# Patient Record
Sex: MALE | Race: WHITE | NOT HISPANIC OR LATINO | Employment: FULL TIME | URBAN - METROPOLITAN AREA
[De-identification: names, ages, dates, MRNs, and addresses within clinical notes are randomized per-mention and may not be internally consistent; named-entity substitution may affect disease eponyms.]

---

## 2017-01-06 ENCOUNTER — HOSPITAL ENCOUNTER (OUTPATIENT)
Dept: INFUSION THERAPY | Facility: HOSPITAL | Age: 62
Discharge: HOME | End: 2017-01-06
Admitting: INTERNAL MEDICINE

## 2017-01-06 VITALS
HEART RATE: 83 BPM | OXYGEN SATURATION: 98 % | TEMPERATURE: 96.3 F | DIASTOLIC BLOOD PRESSURE: 66 MMHG | SYSTOLIC BLOOD PRESSURE: 110 MMHG | RESPIRATION RATE: 20 BRPM

## 2017-01-06 DIAGNOSIS — E83.119 HEMOCHROMATOSIS, UNSPECIFIED HEMOCHROMATOSIS TYPE: Primary | ICD-10-CM

## 2017-01-06 LAB
HCT VFR BLD AUTO: 40.4 % (ref 40.4–52.2)
HGB BLD-MCNC: 13.1 G/DL (ref 13.7–17.6)

## 2017-01-06 PROCEDURE — 85014 HEMATOCRIT: CPT | Performed by: INTERNAL MEDICINE

## 2017-01-06 PROCEDURE — 99195 PHLEBOTOMY: CPT

## 2017-01-06 PROCEDURE — 36415 COLL VENOUS BLD VENIPUNCTURE: CPT

## 2017-01-06 PROCEDURE — 85018 HEMOGLOBIN: CPT | Performed by: INTERNAL MEDICINE

## 2017-01-06 NOTE — IP AVS SNAPSHOT
Rafael Bernstein   2017 10:00 AM   PHLEBOTOMY    Provider:  TRESSA RM 1 [facility]U   Department:  [facility] CARE   Dept Phone:  [phone]                Your Full Care Plan           To Do List     2017 8:00 AM     Appointment with TERSSA RM [facility] ACU at [facility] ([phone])   [address] [place] [facility] [address]            Your Updated Medication List      ASK your doctor about these medications     aspirin 81 MG tablet       HYDROcodone-acetaminophen 5-325 MG per tablet   Commonly known as:  NORCO       meloxicam 15 MG tablet   Commonly known as:  MOBIC               Gung[employer] Signup     [facility] [employer] allows you to send messages to your doctor, view your test results, renew your prescriptions, schedule appointments, and more. To sign up, go to [email or web address] and click on the Sign Up Now link in the New User? box. Enter your [employer] Activation Code exactly as it appears below along with the last four digits of your Social Security Number and your Date of Birth () to complete the sign-up process. If you do not sign up before the expiration date, you must request a new code.    [employer] Activation Code: [email or web address]  Expires: 2017 11:29 AM    If you have questions, you can email [email or web address] or call [phone] to talk to our [employer] staff. Remember, [employer] is NOT to be used for urgent needs. For medical emergencies, dial 911.               Other Info from Your Visit           Allergies     Penicillins       Reason for Visit     Procedure           Vital Signs     Blood Pressure Pulse Temperature Respirations Oxygen Saturation Smoking Status    141/73 98 96.3 °F (35.7 °C) (Oral) 20 98% Current Every Day Smoker      Discharge Instructions     None         SYMPTOMS OF A STROKE    Call 911 or have someone take you to the Emergency Department if you have any of the following:    · Sudden  numbness or weakness of your face, arm or leg especially on one side of the body  · Sudden confusion, diffiiculty speaking or trouble understanding   · Changes in your vision or loss of sight in one eye  · Sudden severe headache with no known cause  · sudden dizziness, trouble walking, loss of balance or coordination    It is important to seek emergency care right away if you have further stroke symptoms. If you get emergency help quickly, the powerful clot-dissolving medicines can reduce the disabilities caused by a stroke.     For more information:    American Stroke Association  9-035-2-STROKE  www.strokeassociation.org           IF YOU SMOKE OR USE TOBACCO PLEASE READ THE FOLLOWING:    Why is smoking bad for me?  Smoking increases the risk of heart disease, lung disease, vascular disease, stroke, and cancer.     If you smoke, STOP!    If you would like more information on quitting smoking, please visit the [facility] website: [email or web address]   This link will provide additional resources including the QUIT line and the Beat the Pack support groups.     For more information:    American Cancer Society  ([phone] 729-9647    American Heart Association  1-417.506.6815

## 2017-01-06 NOTE — PROGRESS NOTES
Prior Phlebotomy: Yes ?X  No ?      If so any side effects?  NONE    Lot # of Phlebotomy bag: [id number]    Start Time: 1115    Stop Time:1121    Amount removed: 569.8 GMS    Grams / 1.06 =536.6 cc’s     TOLERATED TREATMENT WELL. 2 APPLE JUICES BEFORE PROCEDURE, VSS AFTER. LA SITE WITH 2X2 GAUZE AND CO-FLEX PRESSURE DRESSING APPLIED.

## 2017-01-19 ENCOUNTER — HOSPITAL ENCOUNTER (OUTPATIENT)
Dept: INFUSION THERAPY | Facility: HOSPITAL | Age: 62
Discharge: HOME | End: 2017-01-19
Admitting: INTERNAL MEDICINE

## 2017-01-19 VITALS
HEART RATE: 93 BPM | TEMPERATURE: 97.6 F | OXYGEN SATURATION: 94 % | DIASTOLIC BLOOD PRESSURE: 76 MMHG | RESPIRATION RATE: 20 BRPM | SYSTOLIC BLOOD PRESSURE: 138 MMHG

## 2017-01-19 DIAGNOSIS — E83.118 OTHER HEMOCHROMATOSIS: Primary | ICD-10-CM

## 2017-01-19 LAB
HCT VFR BLD AUTO: 36.1 % (ref 40.4–52.2)
HGB BLD-MCNC: 11.2 G/DL (ref 13.7–17.6)

## 2017-01-19 PROCEDURE — 36415 COLL VENOUS BLD VENIPUNCTURE: CPT

## 2017-01-19 PROCEDURE — 85018 HEMOGLOBIN: CPT | Performed by: INTERNAL MEDICINE

## 2017-01-19 PROCEDURE — 85014 HEMATOCRIT: CPT | Performed by: INTERNAL MEDICINE

## 2017-01-19 PROCEDURE — G0463 HOSPITAL OUTPT CLINIC VISIT: HCPCS

## 2017-01-19 NOTE — IP AVS SNAPSHOT
Rafael Bernstein   2017  8:00 AM   PHLEBOTOMY    Provider:  TRESSA RM [facility]   Department:  [facility] CARE   Dept Phone:  [phone]                Your Full Care Plan           To Do List     2017 12:00 PM     Appointment with TRESSA RM 1 [facility] at [facility] ([phone])   [address] [place] [facility] [address]            Your Updated Medication List      ASK your doctor about these medications     aspirin 81 MG tablet       HYDROcodone-acetaminophen 5-325 MG per tablet   Commonly known as:  NORCO       meloxicam 15 MG tablet   Commonly known as:  MOBIC               [email or web address] Signup     [facility] [employer] allows you to send messages to your doctor, view your test results, renew your prescriptions, schedule appointments, and more. To sign up, go to [employer] and click on the Sign Up Now link in the New User? box. Enter your [employer] Activation Code exactly as it appears below along with the last four digits of your Social Security Number and your Date of Birth () to complete the sign-up process. If you do not sign up before the expiration date, you must request a new code.    [employer] Activation Code: [email or web address]  Expires: 2017 11:29 AM    If you have questions, you can email [email or web address] or call [phone] to talk to our [employer] staff. Remember, [employer] is NOT to be used for urgent needs. For medical emergencies, dial 911.               Other Info from Your Visit           Allergies     Penicillins       Reason for Visit     Procedure     Therapeutic Phlebotomy           Vital Signs     Blood Pressure Pulse Temperature Respirations Oxygen Saturation Smoking Status    138/76 93 97.6 °F (36.4 °C) (Oral) 20 94% Current Every Day Smoker      Discharge Instructions     None         SYMPTOMS OF A STROKE    Call 911 or have someone take you to the Emergency Department if you have any of  the following:    · Sudden numbness or weakness of your face, arm or leg especially on one side of the body  · Sudden confusion, diffiiculty speaking or trouble understanding   · Changes in your vision or loss of sight in one eye  · Sudden severe headache with no known cause  · sudden dizziness, trouble walking, loss of balance or coordination    It is important to seek emergency care right away if you have further stroke symptoms. If you get emergency help quickly, the powerful clot-dissolving medicines can reduce the disabilities caused by a stroke.     For more information:    American Stroke Association  7-330-7-STROKE  www.strokeassociation.org           IF YOU SMOKE OR USE TOBACCO PLEASE READ THE FOLLOWING:    Why is smoking bad for me?  Smoking increases the risk of heart disease, lung disease, vascular disease, stroke, and cancer.     If you smoke, STOP!    If you would like more information on quitting smoking, please visit the [facility] website: [email or web address]   This link will provide additional resources including the QUIT line and the Beat the Pack support groups.     For more information:    American Cancer Society  ([phone]    American Heart Association  1-985.781.6730

## 2017-02-02 ENCOUNTER — HOSPITAL ENCOUNTER (OUTPATIENT)
Dept: INFUSION THERAPY | Facility: HOSPITAL | Age: 62
Discharge: HOME | End: 2017-02-02
Admitting: INTERNAL MEDICINE

## 2017-02-02 VITALS
OXYGEN SATURATION: 97 % | SYSTOLIC BLOOD PRESSURE: 125 MMHG | DIASTOLIC BLOOD PRESSURE: 82 MMHG | TEMPERATURE: 97.9 F | RESPIRATION RATE: 20 BRPM | HEART RATE: 79 BPM

## 2017-02-02 DIAGNOSIS — E83.119 HEMOCHROMATOSIS, UNSPECIFIED HEMOCHROMATOSIS TYPE: Primary | ICD-10-CM

## 2017-02-02 LAB
FERRITIN SERPL-MCNC: 79.4 NG/ML (ref 30–400)
HCT VFR BLD AUTO: 37.4 % (ref 40.4–52.2)
HGB BLD-MCNC: 12.2 G/DL (ref 13.7–17.6)

## 2017-02-02 PROCEDURE — 36415 COLL VENOUS BLD VENIPUNCTURE: CPT

## 2017-02-02 PROCEDURE — 85014 HEMATOCRIT: CPT | Performed by: INTERNAL MEDICINE

## 2017-02-02 PROCEDURE — 99195 PHLEBOTOMY: CPT

## 2017-02-02 PROCEDURE — 82728 ASSAY OF FERRITIN: CPT | Performed by: INTERNAL MEDICINE

## 2017-02-02 PROCEDURE — 85018 HEMOGLOBIN: CPT | Performed by: INTERNAL MEDICINE

## 2017-02-02 NOTE — PROGRESS NOTES
Prior Phlebotomy: Yes ?X  No ?      If so any side effects? NONE      Lot # of Phlebotomy bag: [id number]    Start Time: 1320    Stop Time:1328    Amount removed: 558.6 GM    Grams / 1.06 =526.4 cc’s   TOLERATED TREATMENT WITHOUT DIFFICULTY. LA DRESSING AFTER PHLEBOTOMY WITH CO-FLEX.   VSS POST PROCEDURE. PO 2 CUPS OF APPLEJUICE. DC'D HOME AMB AFTER TREATMENT COMPLETED.

## 2017-02-16 ENCOUNTER — HOSPITAL ENCOUNTER (OUTPATIENT)
Dept: INFUSION THERAPY | Facility: HOSPITAL | Age: 62
Discharge: HOME | End: 2017-02-16
Admitting: INTERNAL MEDICINE

## 2017-02-16 VITALS
HEART RATE: 85 BPM | TEMPERATURE: 96.8 F | RESPIRATION RATE: 16 BRPM | SYSTOLIC BLOOD PRESSURE: 119 MMHG | DIASTOLIC BLOOD PRESSURE: 63 MMHG | OXYGEN SATURATION: 94 %

## 2017-02-16 DIAGNOSIS — E83.118 OTHER HEMOCHROMATOSIS: Primary | ICD-10-CM

## 2017-02-16 DIAGNOSIS — E83.119 HEMOCHROMATOSIS, UNSPECIFIED HEMOCHROMATOSIS TYPE: ICD-10-CM

## 2017-02-16 LAB
HCT VFR BLD AUTO: 36.2 % (ref 40.4–52.2)
HGB BLD-MCNC: 11.8 G/DL (ref 13.7–17.6)

## 2017-02-16 PROCEDURE — 85018 HEMOGLOBIN: CPT | Performed by: INTERNAL MEDICINE

## 2017-02-16 PROCEDURE — 99195 PHLEBOTOMY: CPT

## 2017-02-16 PROCEDURE — 85014 HEMATOCRIT: CPT | Performed by: INTERNAL MEDICINE

## 2017-02-16 PROCEDURE — 36415 COLL VENOUS BLD VENIPUNCTURE: CPT

## 2017-02-16 NOTE — PROGRESS NOTES
Prior Phlebotomy: Yes ?X  No ?      If so any side effects?  NONE    Lot # of Phlebotomy bag: [id number]    Start Time: 1310    Stop Time:1322    Amount removed: 552 GRAMS    Grams / 1.06 =511.3 cc’s   TOLERATED TREATMENT WELL. VSS AFTER PHLEBOTOMY, PO JUICE 2 CARTONS, RA WITH 2X2 GAUZE AND CO-FLEX PRESSURE DRESSING TO SITE. DC'D HOME AMB AFTER FLUIDS AND VITAL SIGNS TAKEN.

## 2017-02-21 ENCOUNTER — TELEPHONE (OUTPATIENT)
Dept: FAMILY MEDICINE CLINIC | Facility: CLINIC | Age: 62
End: 2017-02-21

## 2017-02-21 NOTE — TELEPHONE ENCOUNTER
----- Message from James Brice MD sent at 2/21/2017  1:28 PM EST -----  Contact: TELE: [phone]   I am unable to do a physical by next Tuesday.  ----- Message -----     From: Sharita Granda MA     Sent: 2/21/2017   1:07 PM       To: James Brice MD        ----- Message -----     From: Roselia Fonseca     Sent: 2/21/2017   1:00 PM       To: Sharita Granda MA    HE IS GETTING READY TO RETIRE NEXT Tuesday AND HE WANTS TO GET A PHYSICAL IN FOR THE YEAR BEFORE HE LOSES HIS INSURANCE AND HE WANTS TO KNOW IF HE CAN GET IT WORKED IN IF POSS.     Apt scheduled for fatigue to check labs on Monday

## 2017-02-27 ENCOUNTER — OFFICE VISIT (OUTPATIENT)
Dept: FAMILY MEDICINE CLINIC | Facility: CLINIC | Age: 62
End: 2017-02-27

## 2017-02-27 VITALS
HEART RATE: 82 BPM | OXYGEN SATURATION: 96 % | WEIGHT: 222 LBS | SYSTOLIC BLOOD PRESSURE: 112 MMHG | BODY MASS INDEX: 31.78 KG/M2 | HEIGHT: 70 IN | DIASTOLIC BLOOD PRESSURE: 60 MMHG | TEMPERATURE: 98.3 F

## 2017-02-27 DIAGNOSIS — R53.83 FATIGUE, UNSPECIFIED TYPE: Primary | ICD-10-CM

## 2017-02-27 LAB
ALBUMIN SERPL-MCNC: 4 G/DL (ref 3.5–5.2)
ALBUMIN/GLOB SERPL: 1.1 G/DL
ALP SERPL-CCNC: 81 U/L (ref 39–117)
ALT SERPL W P-5'-P-CCNC: 14 U/L (ref 1–41)
ANION GAP SERPL CALCULATED.3IONS-SCNC: 14.3 MMOL/L
AST SERPL-CCNC: 16 U/L (ref 1–40)
BILIRUB SERPL-MCNC: 0.2 MG/DL (ref 0.1–1.2)
BUN BLD-MCNC: 16 MG/DL (ref 8–23)
BUN/CREAT SERPL: 19 (ref 7–25)
CALCIUM SPEC-SCNC: 9.5 MG/DL (ref 8.6–10.5)
CHLORIDE SERPL-SCNC: 104 MMOL/L (ref 98–107)
CO2 SERPL-SCNC: 22.7 MMOL/L (ref 22–29)
CREAT BLD-MCNC: 0.84 MG/DL (ref 0.76–1.27)
ERYTHROCYTE [DISTWIDTH] IN BLOOD BY AUTOMATED COUNT: 14.5 % (ref 4.5–15)
GFR SERPL CREATININE-BSD FRML MDRD: 93 ML/MIN/1.73
GLOBULIN UR ELPH-MCNC: 3.6 GM/DL
GLUCOSE BLD-MCNC: 99 MG/DL (ref 65–99)
HCT VFR BLD AUTO: 35.3 % (ref 35–60)
HGB BLD-MCNC: 11.5 G/DL (ref 13.5–18)
LYMPHOCYTES # BLD AUTO: 2.8 10*3/MM3 (ref 1.2–3.4)
LYMPHOCYTES NFR BLD AUTO: 32.9 % (ref 21–51)
MCH RBC QN AUTO: 29.1 PG (ref 26.1–33.1)
MCHC RBC AUTO-ENTMCNC: 32.6 G/DL (ref 33–37)
MCV RBC AUTO: 89.5 FL (ref 80–99)
MONOCYTES # BLD AUTO: 0.8 10*3/MM3 (ref 0.1–0.6)
MONOCYTES NFR BLD AUTO: 9.8 % (ref 2–9)
NEUTROPHILS # BLD AUTO: 4.9 10*3/MM3 (ref 1.4–6.5)
NEUTROPHILS NFR BLD AUTO: 57.3 % (ref 42–75)
PLATELET # BLD AUTO: 263 10*3/MM3 (ref 150–450)
PMV BLD AUTO: 8.1 FL (ref 7.1–10.5)
POTASSIUM BLD-SCNC: 4.1 MMOL/L (ref 3.5–5.2)
PROT SERPL-MCNC: 7.6 G/DL (ref 6–8.5)
RBC # BLD AUTO: 3.94 10*6/MM3 (ref 4–6)
SODIUM BLD-SCNC: 141 MMOL/L (ref 136–145)
T-UPTAKE NFR SERPL: 1.09 TBI (ref 0.8–1.3)
T4 SERPL-MCNC: 7.57 MCG/DL (ref 4.5–11.7)
TSH SERPL DL<=0.05 MIU/L-ACNC: 3.61 MIU/ML (ref 0.27–4.2)
WBC NRBC COR # BLD: 8.5 10*3/MM3 (ref 4.5–10)

## 2017-02-27 PROCEDURE — 84436 ASSAY OF TOTAL THYROXINE: CPT | Performed by: FAMILY MEDICINE

## 2017-02-27 PROCEDURE — 84443 ASSAY THYROID STIM HORMONE: CPT | Performed by: FAMILY MEDICINE

## 2017-02-27 PROCEDURE — 84479 ASSAY OF THYROID (T3 OR T4): CPT | Performed by: FAMILY MEDICINE

## 2017-02-27 PROCEDURE — 36415 COLL VENOUS BLD VENIPUNCTURE: CPT | Performed by: FAMILY MEDICINE

## 2017-02-27 PROCEDURE — 80053 COMPREHEN METABOLIC PANEL: CPT | Performed by: FAMILY MEDICINE

## 2017-02-27 PROCEDURE — 99213 OFFICE O/P EST LOW 20 MIN: CPT | Performed by: FAMILY MEDICINE

## 2017-02-27 PROCEDURE — 85025 COMPLETE CBC W/AUTO DIFF WBC: CPT | Performed by: FAMILY MEDICINE

## 2017-02-27 NOTE — PROGRESS NOTES
SUBJECTIVE:  The patient is a 62-year-old white male who comes in primarily today because of fatigue.  Over the last month and feels low on energy.  He is up to date on colonoscopy.  He denies any melena.  He does have hemachromatosis.  He has a right knee that was replaced but now started to give him more pain.  He takes hydrocodone twice a day.  He scheduled see his orthopedist next month.  He has some degree of sleep apnea but is not being treated presently.  No chest pain.    PAST MEDICAL HISTORY:  Reviewed.    REVIEW OF SYSTEMS:  Please see above; 14 point ROS otherwise negative.      OBJECTIVE: Vitals signs are reviewed and are stable.    HEENT: PERRLA.    Neck:  Supple.   No masses  Lungs:  Clear.    Heart:  Regular rate and rhythm.    Abdomen:   Soft, nontender.   Bowel sounds present  Extremities:  No cyanosis, clubbing or edema.      ASSESSMENT:     Fatigue    PLAN:   CBC CMP TSH thyroid profile are ordered.  He will call on his results.  He will see his orthopedist as scheduled.  He will follow-up after above.    Much of this encounter note is an electronic transcription/translation of spoken language to printed text.  The electronic translation of spoken language may permit erroneous, or at times, nonsensical words or phrases to be inadvertently transcribed.  Although I have reviewed the note for such errors, some may still exist.

## 2017-03-02 ENCOUNTER — APPOINTMENT (OUTPATIENT)
Dept: INFUSION THERAPY | Facility: HOSPITAL | Age: 62
End: 2017-03-02

## 2017-03-07 ENCOUNTER — TELEPHONE (OUTPATIENT)
Dept: FAMILY MEDICINE CLINIC | Facility: CLINIC | Age: 62
End: 2017-03-07

## 2017-03-08 LAB — GASTROCULT GAST QL: NEGATIVE

## 2017-03-08 PROCEDURE — 82270 OCCULT BLOOD FECES: CPT | Performed by: FAMILY MEDICINE

## 2017-06-01 ENCOUNTER — TELEPHONE (OUTPATIENT)
Dept: FAMILY MEDICINE CLINIC | Facility: CLINIC | Age: 62
End: 2017-06-01

## 2017-06-01 NOTE — TELEPHONE ENCOUNTER
PT'S WIFE SAID HE IS HAVING HEART ISSUES. SHE SAID FOR QUITE AWHILE NOW HE'S BEEN WEAK AND TIRED AND YESTERDAY HE RAISED HIS ARMS ABOVE HIS HEAD AND LOST HIS HEARING. SHE WANTS HIM SEEN TOMORROW BY SOMEONE. PLEASE CALL

## 2017-06-01 NOTE — TELEPHONE ENCOUNTER
Patients wife informed, they wanted to see PHILLIP suarez scheduled for Monday told to go to ER if symptoms worsen or reoccur before then

## 2017-06-01 NOTE — TELEPHONE ENCOUNTER
Needs to be seen by someone.  See if anyone has any openings.  If his symptoms are serious enough and she is concerned he should go to emergency room.

## 2017-07-26 ENCOUNTER — TRANSCRIBE ORDERS (OUTPATIENT)
Dept: ADMINISTRATIVE | Facility: HOSPITAL | Age: 62
End: 2017-07-26

## 2017-07-26 DIAGNOSIS — M48.061 LUMBAR STENOSIS: Primary | ICD-10-CM

## 2018-01-15 ENCOUNTER — HOSPITAL ENCOUNTER (OUTPATIENT)
Dept: GENERAL RADIOLOGY | Facility: HOSPITAL | Age: 63
Discharge: HOME | End: 2018-01-15
Admitting: ORTHOPAEDIC SURGERY

## 2018-01-15 ENCOUNTER — APPOINTMENT (OUTPATIENT)
Dept: PREADMISSION TESTING | Facility: HOSPITAL | Age: 63
End: 2018-01-15

## 2018-01-15 VITALS
RESPIRATION RATE: 16 BRPM | BODY MASS INDEX: 48.47 KG/M2 | SYSTOLIC BLOOD PRESSURE: 107 MMHG | WEIGHT: 246.9 LBS | HEART RATE: 90 BPM | HEIGHT: 60 IN | DIASTOLIC BLOOD PRESSURE: 72 MMHG | TEMPERATURE: 98.3 F | OXYGEN SATURATION: 95 %

## 2018-01-15 LAB
ABO GROUP BLD: NORMAL
ALBUMIN SERPL-MCNC: 4.1 G/DL (ref 3.5–5.2)
ALBUMIN/GLOB SERPL: 0.9 G/DL
ALP SERPL-CCNC: 78 U/L (ref 39–117)
ALT SERPL W P-5'-P-CCNC: 15 U/L (ref 1–41)
ANION GAP SERPL CALCULATED.3IONS-SCNC: 13.6 MMOL/L
AST SERPL-CCNC: 16 U/L (ref 1–40)
BILIRUB SERPL-MCNC: 0.4 MG/DL (ref 0.1–1.2)
BILIRUB UR QL STRIP: NEGATIVE
BLD GP AB SCN SERPL QL: NEGATIVE
BUN BLD-MCNC: 16 MG/DL (ref 8–23)
BUN/CREAT SERPL: 16.3 (ref 7–25)
CALCIUM SPEC-SCNC: 9.6 MG/DL (ref 8.6–10.5)
CHLORIDE SERPL-SCNC: 103 MMOL/L (ref 98–107)
CLARITY UR: CLEAR
CO2 SERPL-SCNC: 24.4 MMOL/L (ref 22–29)
COLOR UR: YELLOW
CREAT BLD-MCNC: 0.98 MG/DL (ref 0.76–1.27)
CRP SERPL-MCNC: 3.11 MG/DL (ref 0–0.5)
DEPRECATED RDW RBC AUTO: 44.9 FL (ref 37–54)
ERYTHROCYTE [DISTWIDTH] IN BLOOD BY AUTOMATED COUNT: 12.9 % (ref 11.5–14.5)
ERYTHROCYTE [SEDIMENTATION RATE] IN BLOOD: 68 MM/HR (ref 0–20)
GFR SERPL CREATININE-BSD FRML MDRD: 78 ML/MIN/1.73
GLOBULIN UR ELPH-MCNC: 4.4 GM/DL
GLUCOSE BLD-MCNC: 127 MG/DL (ref 65–99)
GLUCOSE UR STRIP-MCNC: NEGATIVE MG/DL
HCT VFR BLD AUTO: 41 % (ref 40.4–52.2)
HGB BLD-MCNC: 13.5 G/DL (ref 13.7–17.6)
HGB UR QL STRIP.AUTO: NEGATIVE
INR PPP: 1.01 (ref 0.9–1.1)
KETONES UR QL STRIP: NEGATIVE
LEUKOCYTE ESTERASE UR QL STRIP.AUTO: NEGATIVE
MCH RBC QN AUTO: 31.8 PG (ref 27–32.7)
MCHC RBC AUTO-ENTMCNC: 32.9 G/DL (ref 32.6–36.4)
MCV RBC AUTO: 96.7 FL (ref 79.8–96.2)
NITRITE UR QL STRIP: NEGATIVE
PH UR STRIP.AUTO: 6 [PH] (ref 5–8)
PLATELET # BLD AUTO: 212 10*3/MM3 (ref 140–500)
PMV BLD AUTO: 11.1 FL (ref 6–12)
POTASSIUM BLD-SCNC: 4 MMOL/L (ref 3.5–5.2)
PROT SERPL-MCNC: 8.5 G/DL (ref 6–8.5)
PROT UR QL STRIP: NEGATIVE
PROTHROMBIN TIME: 12.9 SECONDS (ref 11.7–14.2)
RBC # BLD AUTO: 4.24 10*6/MM3 (ref 4.6–6)
RH BLD: NEGATIVE
SODIUM BLD-SCNC: 141 MMOL/L (ref 136–145)
SP GR UR STRIP: 1.02 (ref 1–1.03)
UROBILINOGEN UR QL STRIP: NORMAL
WBC NRBC COR # BLD: 6.43 10*3/MM3 (ref 4.5–10.7)

## 2018-01-15 PROCEDURE — 86900 BLOOD TYPING SEROLOGIC ABO: CPT | Performed by: ORTHOPAEDIC SURGERY

## 2018-01-15 PROCEDURE — 85027 COMPLETE CBC AUTOMATED: CPT | Performed by: ORTHOPAEDIC SURGERY

## 2018-01-15 PROCEDURE — 71046 X-RAY EXAM CHEST 2 VIEWS: CPT

## 2018-01-15 PROCEDURE — 86901 BLOOD TYPING SEROLOGIC RH(D): CPT | Performed by: ORTHOPAEDIC SURGERY

## 2018-01-15 PROCEDURE — 86140 C-REACTIVE PROTEIN: CPT | Performed by: ORTHOPAEDIC SURGERY

## 2018-01-15 PROCEDURE — 36415 COLL VENOUS BLD VENIPUNCTURE: CPT

## 2018-01-15 PROCEDURE — 81003 URINALYSIS AUTO W/O SCOPE: CPT | Performed by: ORTHOPAEDIC SURGERY

## 2018-01-15 PROCEDURE — 86850 RBC ANTIBODY SCREEN: CPT | Performed by: ORTHOPAEDIC SURGERY

## 2018-01-15 PROCEDURE — 80053 COMPREHEN METABOLIC PANEL: CPT | Performed by: ORTHOPAEDIC SURGERY

## 2018-01-15 PROCEDURE — 85610 PROTHROMBIN TIME: CPT | Performed by: ORTHOPAEDIC SURGERY

## 2018-01-15 PROCEDURE — 85652 RBC SED RATE AUTOMATED: CPT | Performed by: ORTHOPAEDIC SURGERY

## 2018-01-15 RX ORDER — NAPROXEN SODIUM 220 MG
220 TABLET ORAL 2 TIMES DAILY PRN
COMMUNITY
End: 2018-01-21 | Stop reason: HOSPADM

## 2018-01-15 RX ORDER — MECLIZINE HYDROCHLORIDE 25 MG/1
25 TABLET ORAL 3 TIMES DAILY PRN
COMMUNITY

## 2018-01-15 RX ORDER — CHLORHEXIDINE GLUCONATE 500 MG/1
CLOTH TOPICAL
COMMUNITY
End: 2018-01-21 | Stop reason: HOSPADM

## 2018-01-15 ASSESSMENT — KOOS JR
KOOS JR SCORE: 36.931
KOOS JR SCORE: 20

## 2018-01-15 NOTE — DISCHARGE INSTRUCTIONS
Take the following medications the morning of surgery with a small sip of water:  CAN TAKE PAIN MEDICATIONS      General Instructions:  • Do not eat solid food after midnight the night before surgery.  • You may drink clear liquids day of surgery but must stop at least one hour before your hospital arrival time.  • It is beneficial for you to have a clear drink that contains carbohydrates the day of surgery.  We suggest a 12 to 20 ounce bottle of Gatorade or Powerade for non-diabetic patients or a 12 to 20 ounce bottle of G2 or Powerade Zero for diabetic patients. (Pediatric patients, are not advised to drink a 12 to 20 ounce carbohydrate drink)    Clear liquids are liquids you can see through.  Nothing red in color.     Plain water                               Sports drinks  Sodas                                   Gelatin (Jell-O)  Fruit juices without pulp such as white grape juice and apple juice  Popsicles that contain no fruit or yogurt  Tea or coffee (no cream or milk added)  Gatorade / Powerade  G2 / Powerade Zero    • Infants may have breast milk up to four hours before surgery.  • Infants drinking formula may drink formula up to six hours before surgery.   • Patients who avoid smoking, chewing tobacco and alcohol for 4 weeks prior to surgery have a reduced risk of post-operative complications.  Quit smoking as many days before surgery as you can.  • Do not smoke, use chewing tobacco or drink alcohol the day of surgery.   • If applicable bring your C-PAP/ BI-PAP machine.  • Bring any papers given to you in the doctor’s office.  • Wear clean comfortable clothes and socks.  • Do not wear contact lenses or make-up.  Bring a case for your glasses.   • Bring crutches or walker if applicable.  • Remove all piercings.  Leave jewelry and any other valuables at home.  • Hair extensions with metal clips must be removed prior to surgery.  • The Pre-Admission Testing nurse will instruct you to bring medications if  unable to obtain an accurate list in Pre-Admission Testing.        If you were given a blood bank ID arm band remember to bring it with you the day of surgery.    Preventing a Surgical Site Infection:  • For 2 to 3 days before surgery, avoid shaving with a razor because the razor can irritate skin and make it easier to develop an infection.  • The night prior to surgery sleep in a clean bed with clean clothing.  Do not allow pets to sleep with you.  • Shower on the morning of surgery using a fresh bar of anti-bacterial soap (such as Dial) and clean washcloth.  Dry with a clean towel and dress in clean clothing.  • Ask your surgeon if you will be receiving antibiotics prior to surgery.  • Make sure you, your family, and all healthcare providers clean their hands with soap and water or an alcohol based hand  before caring for you or your wound.    Day of surgery:  Upon arrival, a Pre-op nurse and Anesthesiologist will review your health history, obtain vital signs, and answer questions you may have.  The only belongings needed at this time will be your home medications and if applicable your C-PAP/BI-PAP machine.  If you are staying overnight your family can leave the rest of your belongings in the car and bring them to your room later.  A Pre-op nurse will start an IV and you may receive medication in preparation for surgery, including something to help you relax.  Your family will be able to see you in the Pre-op area.  While you are in surgery your family should notify the waiting room  if they leave the waiting room area and provide a contact phone number.    Please be aware that surgery does come with discomfort.  We want to make every effort to control your discomfort so please discuss any uncontrolled symptoms with your nurse.   Your doctor will most likely have prescribed pain medications.      If you are going home after surgery you will receive individualized written care instructions  before being discharged.  A responsible adult must drive you to and from the hospital on the day of your surgery and stay with you for 24 hours.    If you are staying overnight following surgery, you will be transported to your hospital room following the recovery period.  [facility] has all private rooms.    If you have any questions please call Pre-Admission Testing at [phone].  Deductibles and co-payments are collected on the day of service. Please be prepared to pay the required co-pay, deductible or deposit on the day of service as defined by your plan.    2% CHLORAHEXIDINE GLUCONATE* CLOTH  Preparing or “prepping” skin before surgery can reduce the risk of infection at the surgical site. To make the process easier, [facility] has chosen disposable cloths moistened with a rinse-free, 2% Chlorhexidine Gluconate (CHG) antiseptic solution. The steps below outline the prepping process and should be carefully followed.        Use the prep cloth on the area that is circled in the diagram             Directions Night before Surgery  1) Shower using a fresh bar of anti-bacterial soap (such as Dial) and clean washcloth.  Use a clean towel to completely dry your skin.  2) Do not use any lotions, oils or creams on your skin.  3) Open the package and remove 1 cloth, wipe your skin for 30 seconds in a circular motion.  Allow to dry for 3 minutes.  4) Repeat #3 with second cloth.  5) Do not touch your eyes, ears, or mouth with the prep cloth.  6) Allow the wet prep solution to air dry.  7) Discard the prep cloth and wash your hands with soap and water.   8) Dress in clean bed clothes and sleep on fresh clean bed sheets.   9) You may experience some temporary itching after the prep.    Directions Day of Surgery  1) Repeat steps 1,2,3,4,5,6,7, and 9.   2) Dress in clean clothes before coming to the hospital.    BACTROBAN NASAL OINTMENT  There are many germs normally in your nose. Bactroban is an  ointment that will help reduce these germs. Please follow these instructions for Bactroban use:    ____Two days before surgery in the evening Date________    ____The day before surgery in the morning  Date________    ____The day before surgery in the evening              Date________    ____The day of surgery in the morning    Date________    **Squirt ½ package of Bactroban Ointment onto a cotton applicator and apply to inside of 1st nostril.  Squirt the remaining Bactroban and apply to the inside of the other nostril.  .

## 2018-01-18 ENCOUNTER — ANESTHESIA (OUTPATIENT)
Dept: PERIOP | Facility: HOSPITAL | Age: 63
End: 2018-01-18

## 2018-01-18 ENCOUNTER — HOSPITAL ENCOUNTER (INPATIENT)
Facility: HOSPITAL | Age: 63
LOS: 3 days | Discharge: HOME HEALTH | End: 2018-01-21
Attending: ORTHOPAEDIC SURGERY | Admitting: ORTHOPAEDIC SURGERY

## 2018-01-18 ENCOUNTER — APPOINTMENT (OUTPATIENT)
Dept: GENERAL RADIOLOGY | Facility: HOSPITAL | Age: 63
End: 2018-01-18

## 2018-01-18 ENCOUNTER — ANESTHESIA EVENT (OUTPATIENT)
Dept: PERIOP | Facility: HOSPITAL | Age: 63
End: 2018-01-18

## 2018-01-18 DIAGNOSIS — M00.9 INFECTION OF RIGHT KNEE (HCC): ICD-10-CM

## 2018-01-18 DIAGNOSIS — Z74.09 IMPAIRED FUNCTIONAL MOBILITY, BALANCE, AND ENDURANCE: Primary | ICD-10-CM

## 2018-01-18 PROBLEM — M17.9 OA (OSTEOARTHRITIS) OF KNEE: Status: ACTIVE | Noted: 2018-01-18

## 2018-01-18 LAB — GLUCOSE BLDC GLUCOMTR-MCNC: 162 MG/DL (ref 70–130)

## 2018-01-18 PROCEDURE — 25010000002 FENTANYL CITRATE (PF) 250 MCG/5ML SOLUTION

## 2018-01-18 PROCEDURE — L1830 KO IMMOB CANVAS LONG PRE OTS: HCPCS | Performed by: ORTHOPAEDIC SURGERY

## 2018-01-18 PROCEDURE — C1776 JOINT DEVICE (IMPLANTABLE): HCPCS | Performed by: ORTHOPAEDIC SURGERY

## 2018-01-18 PROCEDURE — 25010000002 FENTANYL CITRATE (PF) 100 MCG/2ML SOLUTION: Performed by: ANESTHESIOLOGY

## 2018-01-18 PROCEDURE — 87205 SMEAR GRAM STAIN: CPT | Performed by: ORTHOPAEDIC SURGERY

## 2018-01-18 PROCEDURE — 87075 CULTR BACTERIA EXCEPT BLOOD: CPT | Performed by: ORTHOPAEDIC SURGERY

## 2018-01-18 PROCEDURE — 87070 CULTURE OTHR SPECIMN AEROBIC: CPT | Performed by: ORTHOPAEDIC SURGERY

## 2018-01-18 PROCEDURE — 87186 SC STD MICRODIL/AGAR DIL: CPT | Performed by: ORTHOPAEDIC SURGERY

## 2018-01-18 PROCEDURE — 87147 CULTURE TYPE IMMUNOLOGIC: CPT | Performed by: ORTHOPAEDIC SURGERY

## 2018-01-18 PROCEDURE — 82962 GLUCOSE BLOOD TEST: CPT

## 2018-01-18 PROCEDURE — 25010000002 MIDAZOLAM PER 1 MG: Performed by: ANESTHESIOLOGY

## 2018-01-18 PROCEDURE — 0SPC0JZ REMOVAL OF SYNTHETIC SUBSTITUTE FROM RIGHT KNEE JOINT, OPEN APPROACH: ICD-10-PCS | Performed by: ORTHOPAEDIC SURGERY

## 2018-01-18 PROCEDURE — 0SHC08Z INSERTION OF SPACER INTO RIGHT KNEE JOINT, OPEN APPROACH: ICD-10-PCS | Performed by: ORTHOPAEDIC SURGERY

## 2018-01-18 PROCEDURE — 73560 X-RAY EXAM OF KNEE 1 OR 2: CPT

## 2018-01-18 PROCEDURE — 25010000002 VANCOMYCIN PER 500 MG: Performed by: ORTHOPAEDIC SURGERY

## 2018-01-18 PROCEDURE — 25010000002 HYDROMORPHONE PER 4 MG: Performed by: ANESTHESIOLOGY

## 2018-01-18 PROCEDURE — 25010000002 HYDROMORPHONE PER 4 MG

## 2018-01-18 PROCEDURE — 25010000002 ONDANSETRON PER 1 MG: Performed by: ORTHOPAEDIC SURGERY

## 2018-01-18 PROCEDURE — 25010000002 VANCOMYCIN PER 500 MG: Performed by: ANESTHESIOLOGY

## 2018-01-18 PROCEDURE — 87176 TISSUE HOMOGENIZATION CULTR: CPT | Performed by: ORTHOPAEDIC SURGERY

## 2018-01-18 PROCEDURE — 25010000002 PROPOFOL 10 MG/ML EMULSION: Performed by: ANESTHESIOLOGY

## 2018-01-18 PROCEDURE — 25010000002 PHENYLEPHRINE PER 1 ML: Performed by: ANESTHESIOLOGY

## 2018-01-18 PROCEDURE — C1713 ANCHOR/SCREW BN/BN,TIS/BN: HCPCS | Performed by: ORTHOPAEDIC SURGERY

## 2018-01-18 DEVICE — IMPLANTABLE DEVICE: Type: IMPLANTABLE DEVICE | Site: KNEE | Status: FUNCTIONAL

## 2018-01-18 RX ORDER — OXYCODONE AND ACETAMINOPHEN 7.5; 325 MG/1; MG/1
1 TABLET ORAL ONCE AS NEEDED
Status: DISCONTINUED | OUTPATIENT
Start: 2018-01-18 | End: 2018-01-18 | Stop reason: HOSPADM

## 2018-01-18 RX ORDER — OXYCODONE AND ACETAMINOPHEN 10; 325 MG/1; MG/1
1 TABLET ORAL EVERY 4 HOURS PRN
Status: DISCONTINUED | OUTPATIENT
Start: 2018-01-18 | End: 2018-01-21 | Stop reason: HOSPADM

## 2018-01-18 RX ORDER — HYDRALAZINE HYDROCHLORIDE 20 MG/ML
5 INJECTION INTRAMUSCULAR; INTRAVENOUS
Status: DISCONTINUED | OUTPATIENT
Start: 2018-01-18 | End: 2018-01-18 | Stop reason: HOSPADM

## 2018-01-18 RX ORDER — EPHEDRINE SULFATE 50 MG/ML
5 INJECTION, SOLUTION INTRAVENOUS ONCE AS NEEDED
Status: DISCONTINUED | OUTPATIENT
Start: 2018-01-18 | End: 2018-01-18 | Stop reason: HOSPADM

## 2018-01-18 RX ORDER — ACETAMINOPHEN 500 MG
1000 TABLET ORAL EVERY 6 HOURS PRN
COMMUNITY

## 2018-01-18 RX ORDER — MIDAZOLAM HYDROCHLORIDE 1 MG/ML
2 INJECTION INTRAMUSCULAR; INTRAVENOUS
Status: DISCONTINUED | OUTPATIENT
Start: 2018-01-18 | End: 2018-01-18 | Stop reason: HOSPADM

## 2018-01-18 RX ORDER — ACETAMINOPHEN 500 MG
1000 TABLET ORAL ONCE
Status: COMPLETED | OUTPATIENT
Start: 2018-01-18 | End: 2018-01-18

## 2018-01-18 RX ORDER — DIAZEPAM 5 MG/ML
5 INJECTION, SOLUTION INTRAMUSCULAR; INTRAVENOUS 2 TIMES DAILY PRN
Status: DISCONTINUED | OUTPATIENT
Start: 2018-01-18 | End: 2018-01-19 | Stop reason: RX

## 2018-01-18 RX ORDER — NALOXONE HCL 0.4 MG/ML
0.4 VIAL (ML) INJECTION
Status: DISCONTINUED | OUTPATIENT
Start: 2018-01-18 | End: 2018-01-21 | Stop reason: HOSPADM

## 2018-01-18 RX ORDER — SODIUM CHLORIDE 0.9 % (FLUSH) 0.9 %
1-10 SYRINGE (ML) INJECTION AS NEEDED
Status: DISCONTINUED | OUTPATIENT
Start: 2018-01-18 | End: 2018-01-21 | Stop reason: HOSPADM

## 2018-01-18 RX ORDER — HYDROMORPHONE HCL 110MG/55ML
PATIENT CONTROLLED ANALGESIA SYRINGE INTRAVENOUS
Status: COMPLETED
Start: 2018-01-18 | End: 2018-01-18

## 2018-01-18 RX ORDER — HYDROCODONE BITARTRATE AND ACETAMINOPHEN 7.5; 325 MG/1; MG/1
1 TABLET ORAL ONCE AS NEEDED
Status: DISCONTINUED | OUTPATIENT
Start: 2018-01-18 | End: 2018-01-18 | Stop reason: HOSPADM

## 2018-01-18 RX ORDER — LABETALOL HYDROCHLORIDE 5 MG/ML
5 INJECTION, SOLUTION INTRAVENOUS
Status: DISCONTINUED | OUTPATIENT
Start: 2018-01-18 | End: 2018-01-18 | Stop reason: HOSPADM

## 2018-01-18 RX ORDER — MAGNESIUM HYDROXIDE 1200 MG/15ML
LIQUID ORAL AS NEEDED
Status: DISCONTINUED | OUTPATIENT
Start: 2018-01-18 | End: 2018-01-18 | Stop reason: HOSPADM

## 2018-01-18 RX ORDER — ASPIRIN 325 MG
325 TABLET, DELAYED RELEASE (ENTERIC COATED) ORAL 2 TIMES DAILY WITH MEALS
Status: DISCONTINUED | OUTPATIENT
Start: 2018-01-18 | End: 2018-01-21 | Stop reason: HOSPADM

## 2018-01-18 RX ORDER — TRANEXAMIC ACID 100 MG/ML
INJECTION, SOLUTION INTRAVENOUS AS NEEDED
Status: DISCONTINUED | OUTPATIENT
Start: 2018-01-18 | End: 2018-01-18 | Stop reason: SURG

## 2018-01-18 RX ORDER — DIAZEPAM 5 MG/1
5 TABLET ORAL EVERY 6 HOURS PRN
Status: DISCONTINUED | OUTPATIENT
Start: 2018-01-18 | End: 2018-01-21 | Stop reason: HOSPADM

## 2018-01-18 RX ORDER — PROMETHAZINE HYDROCHLORIDE 25 MG/1
25 SUPPOSITORY RECTAL ONCE AS NEEDED
Status: DISCONTINUED | OUTPATIENT
Start: 2018-01-18 | End: 2018-01-18 | Stop reason: HOSPADM

## 2018-01-18 RX ORDER — LIDOCAINE HYDROCHLORIDE 20 MG/ML
INJECTION, SOLUTION INFILTRATION; PERINEURAL AS NEEDED
Status: DISCONTINUED | OUTPATIENT
Start: 2018-01-18 | End: 2018-01-18 | Stop reason: SURG

## 2018-01-18 RX ORDER — PROMETHAZINE HYDROCHLORIDE 25 MG/ML
12.5 INJECTION, SOLUTION INTRAMUSCULAR; INTRAVENOUS ONCE AS NEEDED
Status: DISCONTINUED | OUTPATIENT
Start: 2018-01-18 | End: 2018-01-18 | Stop reason: HOSPADM

## 2018-01-18 RX ORDER — EPHEDRINE SULFATE 50 MG/ML
INJECTION, SOLUTION INTRAVENOUS AS NEEDED
Status: DISCONTINUED | OUTPATIENT
Start: 2018-01-18 | End: 2018-01-18 | Stop reason: SURG

## 2018-01-18 RX ORDER — ACETAMINOPHEN 325 MG/1
325 TABLET ORAL EVERY 4 HOURS PRN
Status: DISCONTINUED | OUTPATIENT
Start: 2018-01-18 | End: 2018-01-21 | Stop reason: HOSPADM

## 2018-01-18 RX ORDER — BISACODYL 10 MG
10 SUPPOSITORY, RECTAL RECTAL DAILY PRN
Status: DISCONTINUED | OUTPATIENT
Start: 2018-01-18 | End: 2018-01-21 | Stop reason: HOSPADM

## 2018-01-18 RX ORDER — MECLIZINE HYDROCHLORIDE 25 MG/1
25 TABLET ORAL 3 TIMES DAILY PRN
Status: DISCONTINUED | OUTPATIENT
Start: 2018-01-18 | End: 2018-01-21 | Stop reason: HOSPADM

## 2018-01-18 RX ORDER — ONDANSETRON 4 MG/1
4 TABLET, FILM COATED ORAL EVERY 6 HOURS PRN
Status: DISCONTINUED | OUTPATIENT
Start: 2018-01-18 | End: 2018-01-21 | Stop reason: HOSPADM

## 2018-01-18 RX ORDER — FAMOTIDINE 10 MG/ML
20 INJECTION, SOLUTION INTRAVENOUS
Status: DISCONTINUED | OUTPATIENT
Start: 2018-01-18 | End: 2018-01-18 | Stop reason: HOSPADM

## 2018-01-18 RX ORDER — NALOXONE HCL 0.4 MG/ML
0.2 VIAL (ML) INJECTION AS NEEDED
Status: DISCONTINUED | OUTPATIENT
Start: 2018-01-18 | End: 2018-01-18 | Stop reason: HOSPADM

## 2018-01-18 RX ORDER — ONDANSETRON 2 MG/ML
4 INJECTION INTRAMUSCULAR; INTRAVENOUS ONCE AS NEEDED
Status: DISCONTINUED | OUTPATIENT
Start: 2018-01-18 | End: 2018-01-18 | Stop reason: HOSPADM

## 2018-01-18 RX ORDER — SENNA AND DOCUSATE SODIUM 50; 8.6 MG/1; MG/1
2 TABLET, FILM COATED ORAL 2 TIMES DAILY PRN
Status: DISCONTINUED | OUTPATIENT
Start: 2018-01-18 | End: 2018-01-19

## 2018-01-18 RX ORDER — ROCURONIUM BROMIDE 10 MG/ML
INJECTION, SOLUTION INTRAVENOUS AS NEEDED
Status: DISCONTINUED | OUTPATIENT
Start: 2018-01-18 | End: 2018-01-18 | Stop reason: SURG

## 2018-01-18 RX ORDER — FENTANYL CITRATE 50 UG/ML
50 INJECTION, SOLUTION INTRAMUSCULAR; INTRAVENOUS
Status: DISCONTINUED | OUTPATIENT
Start: 2018-01-18 | End: 2018-01-18 | Stop reason: HOSPADM

## 2018-01-18 RX ORDER — ONDANSETRON 2 MG/ML
4 INJECTION INTRAMUSCULAR; INTRAVENOUS EVERY 6 HOURS PRN
Status: DISCONTINUED | OUTPATIENT
Start: 2018-01-18 | End: 2018-01-21 | Stop reason: HOSPADM

## 2018-01-18 RX ORDER — FENTANYL CITRATE 50 UG/ML
INJECTION, SOLUTION INTRAMUSCULAR; INTRAVENOUS AS NEEDED
Status: DISCONTINUED | OUTPATIENT
Start: 2018-01-18 | End: 2018-01-18 | Stop reason: SURG

## 2018-01-18 RX ORDER — PROPOFOL 10 MG/ML
VIAL (ML) INTRAVENOUS AS NEEDED
Status: DISCONTINUED | OUTPATIENT
Start: 2018-01-18 | End: 2018-01-18 | Stop reason: SURG

## 2018-01-18 RX ORDER — HYDROMORPHONE HCL 110MG/55ML
0.5 PATIENT CONTROLLED ANALGESIA SYRINGE INTRAVENOUS
Status: DISCONTINUED | OUTPATIENT
Start: 2018-01-18 | End: 2018-01-18 | Stop reason: HOSPADM

## 2018-01-18 RX ORDER — FENTANYL CITRATE 50 UG/ML
INJECTION, SOLUTION INTRAMUSCULAR; INTRAVENOUS
Status: COMPLETED | OUTPATIENT
Start: 2018-01-18 | End: 2018-01-18

## 2018-01-18 RX ORDER — SODIUM CHLORIDE 0.9 % (FLUSH) 0.9 %
1-10 SYRINGE (ML) INJECTION AS NEEDED
Status: DISCONTINUED | OUTPATIENT
Start: 2018-01-18 | End: 2018-01-18 | Stop reason: HOSPADM

## 2018-01-18 RX ORDER — FLUMAZENIL 0.1 MG/ML
0.2 INJECTION INTRAVENOUS AS NEEDED
Status: DISCONTINUED | OUTPATIENT
Start: 2018-01-18 | End: 2018-01-18 | Stop reason: HOSPADM

## 2018-01-18 RX ORDER — PROMETHAZINE HYDROCHLORIDE 25 MG/1
25 TABLET ORAL ONCE AS NEEDED
Status: DISCONTINUED | OUTPATIENT
Start: 2018-01-18 | End: 2018-01-18 | Stop reason: HOSPADM

## 2018-01-18 RX ORDER — DIPHENHYDRAMINE HYDROCHLORIDE 50 MG/ML
12.5 INJECTION INTRAMUSCULAR; INTRAVENOUS
Status: DISCONTINUED | OUTPATIENT
Start: 2018-01-18 | End: 2018-01-18 | Stop reason: HOSPADM

## 2018-01-18 RX ORDER — FERROUS SULFATE 325(65) MG
325 TABLET ORAL
Status: DISCONTINUED | OUTPATIENT
Start: 2018-01-19 | End: 2018-01-21 | Stop reason: HOSPADM

## 2018-01-18 RX ORDER — DOCUSATE SODIUM 100 MG/1
100 CAPSULE, LIQUID FILLED ORAL 2 TIMES DAILY PRN
Status: DISCONTINUED | OUTPATIENT
Start: 2018-01-18 | End: 2018-01-19

## 2018-01-18 RX ORDER — SODIUM CHLORIDE, SODIUM LACTATE, POTASSIUM CHLORIDE, CALCIUM CHLORIDE 600; 310; 30; 20 MG/100ML; MG/100ML; MG/100ML; MG/100ML
9 INJECTION, SOLUTION INTRAVENOUS CONTINUOUS PRN
Status: DISCONTINUED | OUTPATIENT
Start: 2018-01-18 | End: 2018-01-18 | Stop reason: HOSPADM

## 2018-01-18 RX ORDER — MIDAZOLAM HYDROCHLORIDE 1 MG/ML
1 INJECTION INTRAMUSCULAR; INTRAVENOUS
Status: DISCONTINUED | OUTPATIENT
Start: 2018-01-18 | End: 2018-01-18 | Stop reason: HOSPADM

## 2018-01-18 RX ORDER — CELECOXIB 200 MG/1
200 CAPSULE ORAL ONCE
Status: COMPLETED | OUTPATIENT
Start: 2018-01-18 | End: 2018-01-18

## 2018-01-18 RX ORDER — PROMETHAZINE HYDROCHLORIDE 25 MG/1
12.5 TABLET ORAL ONCE AS NEEDED
Status: DISCONTINUED | OUTPATIENT
Start: 2018-01-18 | End: 2018-01-18 | Stop reason: HOSPADM

## 2018-01-18 RX ORDER — KETOROLAC TROMETHAMINE 30 MG/ML
15 INJECTION, SOLUTION INTRAMUSCULAR; INTRAVENOUS EVERY 8 HOURS PRN
Status: DISCONTINUED | OUTPATIENT
Start: 2018-01-18 | End: 2018-01-21 | Stop reason: HOSPADM

## 2018-01-18 RX ORDER — FENTANYL CITRATE 50 UG/ML
INJECTION, SOLUTION INTRAMUSCULAR; INTRAVENOUS
Status: COMPLETED
Start: 2018-01-18 | End: 2018-01-18

## 2018-01-18 RX ORDER — CHOLECALCIFEROL (VITAMIN D3) 125 MCG
5 CAPSULE ORAL NIGHTLY PRN
Status: DISCONTINUED | OUTPATIENT
Start: 2018-01-18 | End: 2018-01-21 | Stop reason: HOSPADM

## 2018-01-18 RX ORDER — OXYCODONE AND ACETAMINOPHEN 10; 325 MG/1; MG/1
2 TABLET ORAL EVERY 4 HOURS PRN
Status: DISCONTINUED | OUTPATIENT
Start: 2018-01-18 | End: 2018-01-21 | Stop reason: HOSPADM

## 2018-01-18 RX ORDER — ACETAMINOPHEN 500 MG
1000 TABLET ORAL EVERY 6 HOURS PRN
Status: DISCONTINUED | OUTPATIENT
Start: 2018-01-18 | End: 2018-01-21 | Stop reason: HOSPADM

## 2018-01-18 RX ORDER — SODIUM CHLORIDE 450 MG/100ML
100 INJECTION, SOLUTION INTRAVENOUS CONTINUOUS
Status: DISCONTINUED | OUTPATIENT
Start: 2018-01-18 | End: 2018-01-21 | Stop reason: HOSPADM

## 2018-01-18 RX ORDER — ONDANSETRON 4 MG/1
4 TABLET, ORALLY DISINTEGRATING ORAL EVERY 6 HOURS PRN
Status: DISCONTINUED | OUTPATIENT
Start: 2018-01-18 | End: 2018-01-21 | Stop reason: HOSPADM

## 2018-01-18 RX ADMIN — FAMOTIDINE 20 MG: 10 INJECTION, SOLUTION INTRAVENOUS at 12:38

## 2018-01-18 RX ADMIN — SODIUM CHLORIDE, POTASSIUM CHLORIDE, SODIUM LACTATE AND CALCIUM CHLORIDE 9 ML/HR: 600; 310; 30; 20 INJECTION, SOLUTION INTRAVENOUS at 12:38

## 2018-01-18 RX ADMIN — TRANEXAMIC ACID 1000 MG: 100 INJECTION, SOLUTION INTRAVENOUS at 15:50

## 2018-01-18 RX ADMIN — FENTANYL CITRATE 100 MCG: 50 INJECTION, SOLUTION INTRAMUSCULAR; INTRAVENOUS at 15:18

## 2018-01-18 RX ADMIN — Medication 2 MG: at 12:37

## 2018-01-18 RX ADMIN — ROCURONIUM BROMIDE 20 MG: 10 INJECTION INTRAVENOUS at 15:56

## 2018-01-18 RX ADMIN — PHENYLEPHRINE HYDROCHLORIDE 100 MCG: 10 INJECTION INTRAVENOUS at 15:45

## 2018-01-18 RX ADMIN — ROCURONIUM BROMIDE 10 MG: 10 INJECTION INTRAVENOUS at 16:46

## 2018-01-18 RX ADMIN — CEFAZOLIN SODIUM 2 G: 1 INJECTION, POWDER, FOR SOLUTION INTRAMUSCULAR; INTRAVENOUS at 15:25

## 2018-01-18 RX ADMIN — DOCUSATE SODIUM 100 MG: 100 CAPSULE, LIQUID FILLED ORAL at 22:28

## 2018-01-18 RX ADMIN — FENTANYL CITRATE 50 MCG: 50 INJECTION INTRAMUSCULAR; INTRAVENOUS at 14:23

## 2018-01-18 RX ADMIN — PHENYLEPHRINE HYDROCHLORIDE 100 MCG: 10 INJECTION INTRAVENOUS at 15:37

## 2018-01-18 RX ADMIN — FENTANYL CITRATE 50 MCG: 50 INJECTION, SOLUTION INTRAMUSCULAR; INTRAVENOUS at 18:25

## 2018-01-18 RX ADMIN — LIDOCAINE HYDROCHLORIDE 100 MG: 20 INJECTION, SOLUTION INFILTRATION; PERINEURAL at 15:18

## 2018-01-18 RX ADMIN — FENTANYL CITRATE 50 MCG: 50 INJECTION, SOLUTION INTRAMUSCULAR; INTRAVENOUS at 19:10

## 2018-01-18 RX ADMIN — ONDANSETRON 4 MG: 2 INJECTION INTRAMUSCULAR; INTRAVENOUS at 22:29

## 2018-01-18 RX ADMIN — FENTANYL CITRATE 50 MCG: 50 INJECTION, SOLUTION INTRAMUSCULAR; INTRAVENOUS at 16:48

## 2018-01-18 RX ADMIN — Medication 1 MG: at 14:23

## 2018-01-18 RX ADMIN — HYDROMORPHONE HYDROCHLORIDE 0.5 MG: 2 INJECTION, SOLUTION INTRAMUSCULAR; INTRAVENOUS; SUBCUTANEOUS at 18:48

## 2018-01-18 RX ADMIN — FENTANYL CITRATE 75 MCG: 50 INJECTION, SOLUTION INTRAMUSCULAR; INTRAVENOUS at 18:18

## 2018-01-18 RX ADMIN — ACETAMINOPHEN 1000 MG: 500 TABLET ORAL at 12:30

## 2018-01-18 RX ADMIN — SUGAMMADEX 300 MG: 100 INJECTION, SOLUTION INTRAVENOUS at 17:39

## 2018-01-18 RX ADMIN — FENTANYL CITRATE 50 MCG: 50 INJECTION, SOLUTION INTRAMUSCULAR; INTRAVENOUS at 18:59

## 2018-01-18 RX ADMIN — ROCURONIUM BROMIDE 50 MG: 10 INJECTION INTRAVENOUS at 15:20

## 2018-01-18 RX ADMIN — HYDROMORPHONE HYDROCHLORIDE 0.5 MG: 2 INJECTION, SOLUTION INTRAMUSCULAR; INTRAVENOUS; SUBCUTANEOUS at 19:22

## 2018-01-18 RX ADMIN — PHENYLEPHRINE HYDROCHLORIDE 100 MCG: 10 INJECTION INTRAVENOUS at 15:21

## 2018-01-18 RX ADMIN — FENTANYL CITRATE 50 MCG: 50 INJECTION, SOLUTION INTRAMUSCULAR; INTRAVENOUS at 17:15

## 2018-01-18 RX ADMIN — VANCOMYCIN HYDROCHLORIDE 1 G: 1 INJECTION, POWDER, LYOPHILIZED, FOR SOLUTION INTRAVENOUS at 17:53

## 2018-01-18 RX ADMIN — FENTANYL CITRATE 50 MCG: 50 INJECTION, SOLUTION INTRAMUSCULAR; INTRAVENOUS at 19:22

## 2018-01-18 RX ADMIN — PHENYLEPHRINE HYDROCHLORIDE 100 MCG: 10 INJECTION INTRAVENOUS at 17:43

## 2018-01-18 RX ADMIN — FENTANYL CITRATE 50 MCG: 50 INJECTION, SOLUTION INTRAMUSCULAR; INTRAVENOUS at 16:17

## 2018-01-18 RX ADMIN — PROPOFOL 200 MG: 10 INJECTION, EMULSION INTRAVENOUS at 15:19

## 2018-01-18 RX ADMIN — PHENYLEPHRINE HYDROCHLORIDE 100 MCG: 10 INJECTION INTRAVENOUS at 15:34

## 2018-01-18 RX ADMIN — OXYCODONE HYDROCHLORIDE AND ACETAMINOPHEN 2 TABLET: 10; 325 TABLET ORAL at 22:33

## 2018-01-18 RX ADMIN — HYDROMORPHONE HYDROCHLORIDE 0.5 MG: 2 INJECTION, SOLUTION INTRAMUSCULAR; INTRAVENOUS; SUBCUTANEOUS at 18:59

## 2018-01-18 RX ADMIN — CELECOXIB 200 MG: 200 CAPSULE ORAL at 12:31

## 2018-01-18 RX ADMIN — ROCURONIUM BROMIDE 10 MG: 10 INJECTION INTRAVENOUS at 16:09

## 2018-01-18 RX ADMIN — FENTANYL CITRATE 50 MCG: 50 INJECTION, SOLUTION INTRAMUSCULAR; INTRAVENOUS at 16:33

## 2018-01-18 RX ADMIN — EPHEDRINE SULFATE 10 MG: 50 INJECTION INTRAMUSCULAR; INTRAVENOUS; SUBCUTANEOUS at 15:21

## 2018-01-18 RX ADMIN — FENTANYL CITRATE 25 MCG: 50 INJECTION, SOLUTION INTRAMUSCULAR; INTRAVENOUS at 17:35

## 2018-01-18 NOTE — ANESTHESIA PREPROCEDURE EVALUATION
Anesthesia Evaluation     Patient summary reviewed          Airway   Mallampati: II  Neck ROM: full  no difficulty expected  Dental    (+) poor dentition        Pulmonary    (+) sleep apnea,   Cardiovascular     Rhythm: regular    (+) hyperlipidemia  (-) angina, GOYAL    ROS comment: Denies CV symptoms    Neuro/Psych  GI/Hepatic/Renal/Endo    (+) obesity,      Musculoskeletal     Abdominal    Substance History      OB/GYN          Other   (+) blood dyscrasia (hemochromatosis)                                         Anesthesia Plan    ASA 3     general     intravenous induction   Anesthetic plan and risks discussed with patient.  Use of blood products discussed with patient .

## 2018-01-18 NOTE — OP NOTE
Operative Note    Patient Name:  Rafael Bernstein  YOB: 1955  Medical Records Number:  [id number]    Date of Procedure:  1/18/2018    Pre-operative Diagnosis:  Infected Right Total Knee Arthroplasty    Post-operative Diagnosis:  Infected Right Total Knee Arthroplasty    Procedure Performed:  Right Total Knee Arthroplasty Resection and Antibiotic Spacer Placement    Implants:  Biomet Antibiotic Spacer Molds, Size 65 Femur and 80 Tibia    Surgeon:  Wally Ventura M.D.    Assistant: Keny Oliver (who was present during the critical portions of the case, thereby decreasing operative time and patient morbidity)    Anesthetic Type:  General    Estimated Blood Loss:  250cc's    Specimens:   Order Name Source Comment Collection Info Order Time   ANAEROBIC CULTURE Knee, Right   Dx: Infection right total knee Collected By: Wally Ventura MD 1/18/2018  3:50 PM   WOUND CULTURE Knee, Right   Dx: Infection right total knee Collected By: Wally Ventura MD 1/18/2018  3:50 PM   ANAEROBIC CULTURE Knee, Right   Dx: Infection right total knee Collected By: Wally Ventura MD 1/18/2018  3:52 PM   TISSUE / BONE CULTURE Knee, Right   Dx: Infection right total knee Collected By: Wally Ventura MD 1/18/2018  3:52 PM   ANAEROBIC CULTURE Leg, Right  Collected By: Wally Ventura MD 1/18/2018  3:57 PM   ANAEROBIC CULTURE Leg, Right  Collected By: Wally Ventura MD 1/18/2018  3:57 PM   WOUND CULTURE Leg, Right  Collected By: Wally Ventura MD 1/18/2018  3:57 PM   WOUND CULTURE Leg, Right  Collected By: Wally Ventura MD 1/18/2018  3:57 PM       No Complications      Indications for Procedure:  Rafael Bernstein is a 62 y.o. male suffering from end stage degenerative changes in the right knee.  The patients pain is becoming disabling, despite extensive conservative care, including NSAIDS, therapy and injections.  The risks, benefits and alternatives were discussed and the patient wishes to proceed with right  "total knee arthroplasty.      Procedure Performed:    After informed consent was obtained the patient was taken to the operating room and placed supine on the operating table.  After general anesthesia induced, the patient's right lower extremity was prepped with chloraprep and draped in a sterile fashion.    A midline incision was made overlying the right knee and we sharply dissected down to expose the parapatellar retinaculum.  A mid-vastus, muscle sparing, parapatellar arthrotomy was performed and we elevated the soft tissue both medially and laterally.  Upon entering the joint there was significant adhesions and scar tissue.  We re-established our medial and lateral gutters and removed significant fibrous scar tissue.  We performed a thorough debridement of all infected appearing tissue.  We everted the patella and removed the patella button and all cement from the patella.  We then removed the femoral and tibial component using zelda and flexible osteotomes, taking care to preserve as much bone as possible.  All cement and infected appearing bone was removed.  We excised the sinus tract medially and scraped the IM canals with large curettes.  We sized the femur to be a 65 spacer mold and the tibia to be an 80 spacer mold.  We added 1 gm of Vancomycin per two packages of our [place] G cement and mixed in two separate batches.  We copiously irrigated the knee with 10 liters of Normal Saline and Bacitracin.  We placed our antibiotic cement spacer molds within the joint.   We placed a 3/16\" hemovac drain, then closed the arthrotomy with #1 PDS sutures.  The subcutaneous tissue was closed with 2-0 PDS sutures.  The skin was closed with staples.  We placed a sterile dressing of Xeroform, FERNANDO Dressing, cast padding and an Ace Wrap.  The patient was then awakened from general anesthesia and taken to the recovery room in stable condition.    The patient will be started on Aspirin 325mg twice daily for DVT " prophylaxis.  IV antibiotics will be discontinued within 24 hours of surgery.  Immediately prior to surgery, there were no acute Thromboembolic nor Cardiovascular risk factors.  An updated Medical Reconciliation form is on the chart.

## 2018-01-18 NOTE — H&P
"History and Physical    Patient Name:  Rafael Bernstein  YOB: 1955    Medical Records Number:  [id number]    Date of Admission:  1/18/2018 11:51 AM    Chief Complaint:  OA (osteoarthritis) of knee [M17.10]    Rafael Bernstein is a 62 y.o. male who presents c/o severe right knee pain.  The pain has been on and off since his total knee replacement 9/16 but worsened significantly 4 months ago.  The pain is a constant dull ache with occasional sharp, stabbing pain.  The patient has an elevated CRP and ESR as well as a draining sinus consistent with a knee infection.      /76 (BP Location: Left arm, Patient Position: Lying)  Pulse 84  Temp 98.2 °F (36.8 °C) (Oral)   Resp 20  Ht 177.8 cm (70\")  Wt 114 kg (250 lb 8 oz)  SpO2 97% Comment: pos sedatiion  BMI 35.94 kg/m2    Past Medical History:    Past Medical History:   Diagnosis Date   • Arthritis    • Benign positional vertigo    • CHF (congestive heart failure)     2012   • Colon polyps    • GERD (gastroesophageal reflux disease)    • Hemochromatosis    • Hemochromatosis    • History of mononucleosis    • Hyperlipidemia    • Internal hemorrhoids    • Sleep apnea     DOES NOT USE MACHINE   • SOB (shortness of breath)        Social History:    Social History     Social History   • Marital status:      Spouse name: N/A   • Number of children: 2   • Years of education: N/A     Occupational History   • food       Social History Main Topics   • Smoking status: Former Smoker     Packs/day: 2.00     Years: 46.00     Types: Cigarettes     Start date: 1970     Quit date: 9/2/2017   • Smokeless tobacco: Never Used   • Alcohol use 3.6 oz/week     6 Cans of beer per week      Comment: daily   • Drug use: Yes     Special: Hydrocodone   • Sexual activity: Defer     Other Topics Concern   • Not on file     Social History Narrative       Family History:    Family History   Problem Relation Age of Onset   • Colon cancer Brother    • " Bone cancer Father 76   • Prostate cancer Brother    • Cancer Mother 65   • Cancer Sister 53   • Hypertension Brother    • Malig Hyperthermia Neg Hx        Current Medications:    Current Facility-Administered Medications:   •  ceFAZolin (ANCEF) in SWFI 2 g/20ml IV PUSH syringe, 2 g, Intravenous, Once, Wally Ventura MD  •  famotidine (PEPCID) injection 20 mg, 20 mg, Intravenous, 60 Min Pre-Op, Chente Lopez MD, 20 mg at 01/18/18 1238  •  lactated ringers infusion, 9 mL/hr, Intravenous, Continuous PRN, Chente Lopez MD, Last Rate: 9 mL/hr at 01/18/18 1238, 9 mL/hr at 01/18/18 1238  •  midazolam (VERSED) injection 1 mg, 1 mg, Intravenous, Q5 Min PRN **OR** midazolam (VERSED) injection 2 mg, 2 mg, Intravenous, Q5 Min PRN, Chente Lopez MD, 2 mg at 01/18/18 1237  •  ropivacaine (NAROPIN) 50 mL, Morphine (PF) 5 mg, ketorolac (TORADOL) 30 mg, EPINEPHrine PF (ADRENALIN) 0.3 mg in sodium chloride 0.9 % 101.8 mL, , Injection, Once, Wally Ventura MD  •  sodium chloride 0.9 % flush 1-10 mL, 1-10 mL, Intravenous, PRN, Chente Lopez MD    Allergies:  No Active Allergies    Review of Systems:   HEENT: Patient denies any headaches, vision changes, change in hearing, or tinnitus, Patient denies any rhinorrhea,epistaxis, sinus pain, mouth or dental problems, sore throat or hoarseness, or dysphagia  Pulmonary: Patient denies any cough, congestion, SOA, or wheezing  Cardiovascular: Patient denies any chest pain, dyspnea, palpitations, weakness, intolerance of exercise, varicosities, swelling of extremities, known murmur  Gastrointestinal:  Patient denies nausea, vomiting, diarrhea, constipation, loss  of appetite, change in appetite, dysphagia, gas, heartburn, melena, change in bowel habits, use of laxatives or other drugs to alter the function of the gastrointestinal tract.  Genital/Urinary: Patient denies dysuria, change in color of urine, change in frequency of urination, pain with urgency, incontinence,  retention, or nocturia.  Musculoskeletal: Patient denies increased warmth; redness; or swelling of joints; limitation of function; deformity; crepitation: pain in a joint or an extremity, the neck, or the back, especially with movement.  Neurological: Patient denies dizziness, tremor, ataxia, difficulty in speaking, change in speech, paresthesia, loss of sensation, seizures, syncope, changes in memory.  Endocrine system: Patient denies tremors, palpitations, intolerance of heat or cold, polyuria, polydipsia, polyphagia, diaphoresis, exophthalmos, or goiter.  Psychological: Patient denies thoughts/plans or harming self or other; depression,  insomnia, night terrors, janice, memory loss, disorientation.  Skin: Patient denies any bruising, rashes, discoloration, pruritus, wounds, ulcers, decubiti, changes in the hair or nails  Hematopoietic: Patient denies history of spontaneous or excessive bleeding, epistaxis, hematuria, melena, fatigue, enlarged or tender lymph nodes, pallor, history of anemia.      Physical Exam:   Awake, A&O x3, affect normal, no acute distress  Ambulating with a limp due to knee pain  Knee ROM is limited due to pain (5-115)  Strength is 4/5 in the quad, hamstring and calf  Cap refill is normal, Sensation intact    Card:  RR, HD Stable  Pulm:  Regular breathing, no S.O.A  Abd:  Soft, NT, ND    Lab Results (last 24 hours)     ** No results found for the last 24 hours. **          Xr Chest Pa & Lateral    Result Date: 1/15/2018  Narrative: PA AND LATERAL CHEST  HISTORY:  Preop knee revision surgery.  COMPARISON: PA and lateral chest 09/08/2016  FINDINGS: The cardiomediastinal silhouette is within normal limits. The lungs appear clear and there is no evidence for pulmonary edema, pleural effusion or infiltrate. Endplate spurring is present in the thoracic spine.      Impression: No interval change or evidence for active disease in the chest.  This report was finalized on 1/15/2018 12:24 PM by   Anthony Ladd MD.        Assessment:  Infected Right Total Knee Arthroplasty    Plan:  Patient's pain is becoming disabling, despite extensive conservative treatment.  Radiographs reveal good implant position and alignment.  His CRP and ESR are both significantly elevated and he has developed a draining sinus consistent with a total knee infection..  The risks of surgery, including, but not limited to, heart attack, stroke, dying, DVT, nerve injury, vascular injury, arthrofibrosis and infection were discussed.  The alternatives and benefits were also discussed.  All questions answered and the patient wishes to proceed with right total knee resection arthroplasty and placement of antibiotic spacer.

## 2018-01-18 NOTE — ANESTHESIA PROCEDURE NOTES
Peripheral Block    Patient location during procedure: pre-op  Start time: 1/18/2018 2:17 PM  Stop time: 1/18/2018 2:27 PM  Reason for block: at surgeon's request and post-op pain management  Performed by  Anesthesiologist: BERNADETTE DELUNA  Preanesthetic Checklist  Completed: patient identified, site marked, surgical consent, pre-op evaluation, timeout performed, IV checked, risks and benefits discussed and monitors and equipment checked  Prep:  Prep: ChloraPrep  Patient monitoring: continuous pulse oximetry  Procedure  Sedation:yes  Performed under: PNB  Guidance:ultrasound guided  ULTRASOUND INTERPRETATION.  Using ultrasound guidance a 20 G gauge needle was placed in close proximity to the femoral nerve, at which point, under ultrasound guidance anesthetic was injected in the area of the nerve and spread of the anesthesia was seen on ultrasound in close proximity thereto.  There were no abnormalities seen on ultrasound; a digital image was taken; and the patient tolerated the procedure with no complications. Images:still images obtained    Laterality:right  Block Type:adductor canal block  Injection Technique:single-shot  Needle Type:echogenic  Needle Gauge:20 G    Medications  Depomedrol:40 mg  Local Injected:ropivacaine 0.5% Local Amount Injected:30mL  Post Assessment  Injection Assessment: negative aspiration for heme, no paresthesia on injection and incremental injection  Patient Tolerance:comfortable throughout block  Complications:no

## 2018-01-18 NOTE — PLAN OF CARE
Problem: Patient Care Overview (Adult)  Goal: Plan of Care Review  Outcome: Ongoing (interventions implemented as appropriate)   01/18/18 1228   Coping/Psychosocial Response Interventions   Plan Of Care Reviewed With patient   Patient Care Overview   Progress no change     Goal: Adult Individualization and Mutuality  Outcome: Ongoing (interventions implemented as appropriate)   01/18/18 1228   Individualization   Patient Specific Preferences Rafael     Goal: Discharge Needs Assessment  Outcome: Ongoing (interventions implemented as appropriate)      Problem: Perioperative Period (Adult)  Goal: Signs and Symptoms of Listed Potential Problems Will be Absent or Manageable (Perioperative Period)  Outcome: Ongoing (interventions implemented as appropriate)   01/18/18 1228   Perioperative Period   Problems Assessed (Perioperative Period) all   Problems Present (Perioperative Period) pain

## 2018-01-18 NOTE — ANESTHESIA PROCEDURE NOTES
Airway  Urgency: elective    Airway not difficult    General Information and Staff    Patient location during procedure: OR  Anesthesiologist: ROQUE MCKEON    Indications and Patient Condition  Indications for airway management: airway protection    Preoxygenated: yes  Mask difficulty assessment: 2 - vent by mask + OA or adjuvant +/- NMBA    Final Airway Details  Final airway type: endotracheal airway      Successful airway: ETT  Cuffed: yes   Successful intubation technique: direct laryngoscopy  Facilitating devices/methods: anterior pressure/BURP  Endotracheal tube insertion site: oral  Blade: Rivas  Blade size: #2  ETT size: 7.5 mm  Cormack-Lehane Classification: grade I - full view of glottis  Placement verified by: chest auscultation and capnometry   Cuff volume (mL): 7  Measured from: teeth  Number of attempts at approach: 1    Additional Comments  Easy, atraumatic.

## 2018-01-19 ENCOUNTER — APPOINTMENT (OUTPATIENT)
Dept: PREADMISSION TESTING | Facility: HOSPITAL | Age: 63
End: 2018-01-19

## 2018-01-19 LAB
ANION GAP SERPL CALCULATED.3IONS-SCNC: 11.9 MMOL/L
BASOPHILS # BLD AUTO: 0.01 10*3/MM3 (ref 0–0.2)
BASOPHILS NFR BLD AUTO: 0.1 % (ref 0–1.5)
BUN BLD-MCNC: 12 MG/DL (ref 8–23)
BUN/CREAT SERPL: 14.1 (ref 7–25)
CALCIUM SPEC-SCNC: 8.7 MG/DL (ref 8.6–10.5)
CHLORIDE SERPL-SCNC: 96 MMOL/L (ref 98–107)
CO2 SERPL-SCNC: 25.1 MMOL/L (ref 22–29)
CREAT BLD-MCNC: 0.85 MG/DL (ref 0.76–1.27)
DEPRECATED RDW RBC AUTO: 47.1 FL (ref 37–54)
EOSINOPHIL # BLD AUTO: 0.02 10*3/MM3 (ref 0–0.7)
EOSINOPHIL NFR BLD AUTO: 0.2 % (ref 0.3–6.2)
ERYTHROCYTE [DISTWIDTH] IN BLOOD BY AUTOMATED COUNT: 13.1 % (ref 11.5–14.5)
GFR SERPL CREATININE-BSD FRML MDRD: 91 ML/MIN/1.73
GLUCOSE BLD-MCNC: 123 MG/DL (ref 65–99)
HCT VFR BLD AUTO: 37.9 % (ref 40.4–52.2)
HGB BLD-MCNC: 12 G/DL (ref 13.7–17.6)
IMM GRANULOCYTES # BLD: 0.02 10*3/MM3 (ref 0–0.03)
IMM GRANULOCYTES NFR BLD: 0.2 % (ref 0–0.5)
LYMPHOCYTES # BLD AUTO: 1.84 10*3/MM3 (ref 0.9–4.8)
LYMPHOCYTES NFR BLD AUTO: 20.7 % (ref 19.6–45.3)
MCH RBC QN AUTO: 31.1 PG (ref 27–32.7)
MCHC RBC AUTO-ENTMCNC: 31.7 G/DL (ref 32.6–36.4)
MCV RBC AUTO: 98.2 FL (ref 79.8–96.2)
MONOCYTES # BLD AUTO: 0.93 10*3/MM3 (ref 0.2–1.2)
MONOCYTES NFR BLD AUTO: 10.5 % (ref 5–12)
NEUTROPHILS # BLD AUTO: 6.07 10*3/MM3 (ref 1.9–8.1)
NEUTROPHILS NFR BLD AUTO: 68.3 % (ref 42.7–76)
NRBC BLD MANUAL-RTO: 0 /100 WBC (ref 0–0)
PLATELET # BLD AUTO: 178 10*3/MM3 (ref 140–500)
PMV BLD AUTO: 10.7 FL (ref 6–12)
POTASSIUM BLD-SCNC: 4.2 MMOL/L (ref 3.5–5.2)
RBC # BLD AUTO: 3.86 10*6/MM3 (ref 4.6–6)
SODIUM BLD-SCNC: 133 MMOL/L (ref 136–145)
WBC NRBC COR # BLD: 8.89 10*3/MM3 (ref 4.5–10.7)

## 2018-01-19 PROCEDURE — 25010000002 VANCOMYCIN 10 G RECONSTITUTED SOLUTION: Performed by: ORTHOPAEDIC SURGERY

## 2018-01-19 PROCEDURE — C1751 CATH, INF, PER/CENT/MIDLINE: HCPCS

## 2018-01-19 PROCEDURE — 97162 PT EVAL MOD COMPLEX 30 MIN: CPT

## 2018-01-19 PROCEDURE — 85025 COMPLETE CBC W/AUTO DIFF WBC: CPT | Performed by: ORTHOPAEDIC SURGERY

## 2018-01-19 PROCEDURE — 99255 IP/OBS CONSLTJ NEW/EST HI 80: CPT | Performed by: INTERNAL MEDICINE

## 2018-01-19 PROCEDURE — 02HV33Z INSERTION OF INFUSION DEVICE INTO SUPERIOR VENA CAVA, PERCUTANEOUS APPROACH: ICD-10-PCS | Performed by: INTERNAL MEDICINE

## 2018-01-19 PROCEDURE — 80048 BASIC METABOLIC PNL TOTAL CA: CPT | Performed by: ORTHOPAEDIC SURGERY

## 2018-01-19 PROCEDURE — 97110 THERAPEUTIC EXERCISES: CPT

## 2018-01-19 RX ORDER — DOCUSATE SODIUM 100 MG/1
100 CAPSULE, LIQUID FILLED ORAL 2 TIMES DAILY
Status: DISCONTINUED | OUTPATIENT
Start: 2018-01-19 | End: 2018-01-21 | Stop reason: HOSPADM

## 2018-01-19 RX ORDER — SENNA AND DOCUSATE SODIUM 50; 8.6 MG/1; MG/1
2 TABLET, FILM COATED ORAL 2 TIMES DAILY
Status: DISCONTINUED | OUTPATIENT
Start: 2018-01-19 | End: 2018-01-21 | Stop reason: HOSPADM

## 2018-01-19 RX ADMIN — DOCUSATE SODIUM -SENNOSIDES 2 TABLET: 50; 8.6 TABLET, COATED ORAL at 20:00

## 2018-01-19 RX ADMIN — OXYCODONE HYDROCHLORIDE AND ACETAMINOPHEN 2 TABLET: 10; 325 TABLET ORAL at 05:24

## 2018-01-19 RX ADMIN — ASPIRIN 325 MG: 325 TABLET, COATED ORAL at 08:58

## 2018-01-19 RX ADMIN — DOCUSATE SODIUM -SENNOSIDES 2 TABLET: 50; 8.6 TABLET, COATED ORAL at 00:28

## 2018-01-19 RX ADMIN — ACETAMINOPHEN 325 MG: 325 TABLET ORAL at 20:01

## 2018-01-19 RX ADMIN — ASPIRIN 325 MG: 325 TABLET, COATED ORAL at 00:29

## 2018-01-19 RX ADMIN — FERROUS SULFATE TAB 325 MG (65 MG ELEMENTAL FE) 325 MG: 325 (65 FE) TAB at 08:58

## 2018-01-19 RX ADMIN — OXYCODONE HYDROCHLORIDE AND ACETAMINOPHEN 2 TABLET: 10; 325 TABLET ORAL at 21:54

## 2018-01-19 RX ADMIN — MUPIROCIN: 20 OINTMENT TOPICAL at 08:58

## 2018-01-19 RX ADMIN — OXYCODONE HYDROCHLORIDE AND ACETAMINOPHEN 2 TABLET: 10; 325 TABLET ORAL at 09:58

## 2018-01-19 RX ADMIN — VANCOMYCIN HYDROCHLORIDE 1250 MG: 100 INJECTION, POWDER, LYOPHILIZED, FOR SOLUTION INTRAVENOUS at 00:28

## 2018-01-19 RX ADMIN — VANCOMYCIN HYDROCHLORIDE 1750 MG: 1 INJECTION, POWDER, LYOPHILIZED, FOR SOLUTION INTRAVENOUS at 12:13

## 2018-01-19 RX ADMIN — MUPIROCIN: 20 OINTMENT TOPICAL at 00:29

## 2018-01-19 RX ADMIN — ASPIRIN 325 MG: 325 TABLET, COATED ORAL at 17:44

## 2018-01-19 RX ADMIN — SODIUM CHLORIDE 100 ML/HR: 4.5 INJECTION, SOLUTION INTRAVENOUS at 00:34

## 2018-01-19 RX ADMIN — OXYCODONE HYDROCHLORIDE AND ACETAMINOPHEN 2 TABLET: 10; 325 TABLET ORAL at 17:44

## 2018-01-19 RX ADMIN — MUPIROCIN: 20 OINTMENT TOPICAL at 20:01

## 2018-01-19 NOTE — PLAN OF CARE
Problem: Patient Care Overview (Adult)  Goal: Plan of Care Review  Outcome: Ongoing (interventions implemented as appropriate)   01/19/18 0230   Coping/Psychosocial Response Interventions   Plan Of Care Reviewed With patient   Patient Care Overview   Progress improving   Outcome Evaluation   Outcome Summary/Follow up Plan PO PAIN MEDICATION HELPING WITH PAIN. VSS. KNEE IMMOBILIZER TO RIGHT LEG. HV / FERNANDO DRESSING IN PLACE. . EDUCATION PROVIDED ON THE IMPORTANCE OF CPAP TO HELP WITH SLEEP APNEA.     Goal: Adult Individualization and Mutuality  Outcome: Ongoing (interventions implemented as appropriate)    Goal: Discharge Needs Assessment  Outcome: Ongoing (interventions implemented as appropriate)      Problem: Perioperative Period (Adult)  Goal: Signs and Symptoms of Listed Potential Problems Will be Absent or Manageable (Perioperative Period)  Outcome: Ongoing (interventions implemented as appropriate)      Problem: Fall Risk (Adult)  Goal: Identify Related Risk Factors and Signs and Symptoms  Outcome: Outcome(s) achieved Date Met: 01/19/18    Goal: Absence of Falls  Outcome: Ongoing (interventions implemented as appropriate)      Problem: Knee Replacement, Total (Adult)  Goal: Signs and Symptoms of Listed Potential Problems Will be Absent or Manageable (Knee Replacement, Total)  Outcome: Ongoing (interventions implemented as appropriate)

## 2018-01-19 NOTE — PROGRESS NOTES
"Ortho POD 1    Patients Name:  Rafael Bernstein  YOB: 1955  Medical Records Number:  [id number]    No complaints except pain    /67 (BP Location: Left arm, Patient Position: Lying)  Pulse 89  Temp 99.1 °F (37.3 °C) (Oral)   Resp 16  Ht 177.8 cm (70\")  Wt 114 kg (250 lb 8 oz)  SpO2 95%  BMI 35.94 kg/m2    RLE:  NVI, calf nontender, sensation intact  No signs of DVT    Incision: clean, intact    Lab Results (last 24 hours)     Procedure Component Value Units Date/Time    Anaerobic Culture - Wound, Knee, Right [[id number]] Collected:  01/18/18 1545    Specimen:  Wound from Knee, Right Updated:  01/18/18 1645    Anaerobic Culture - Wound, Leg, Right [[id number]] Collected:  01/18/18 1556    Specimen:  Wound from Leg, Right Updated:  01/18/18 1645    Anaerobic Culture - Wound, Leg, Right [[id number]] Collected:  01/18/18 1555    Specimen:  Wound from Leg, Right Updated:  01/18/18 1645    Anaerobic Culture - Tissue, Knee, Right [[id number]] Collected:  01/18/18 1550    Specimen:  Tissue from Knee, Right Updated:  01/18/18 1645    POC Glucose Once [[id number]]  (Abnormal) Collected:  01/18/18 2025    Specimen:  Blood Updated:  01/18/18 2036     Glucose 162 (H) mg/dL     Narrative:       Meter: [id number] : [id number] Obinna JUNG    Basic Metabolic Panel [[id number]]  (Abnormal) Collected:  01/19/18 0426    Specimen:  Blood Updated:  01/19/18 0533     Glucose 123 (H) mg/dL      BUN 12 mg/dL      Creatinine 0.85 mg/dL      Sodium 133 (L) mmol/L      Potassium 4.2 mmol/L      Chloride 96 (L) mmol/L      CO2 25.1 mmol/L      Calcium 8.7 mg/dL      eGFR Non African Amer 91 mL/min/1.73      BUN/Creatinine Ratio 14.1     Anion Gap 11.9 mmol/L     Narrative:       GFR Normal >60  Chronic Kidney Disease <60  Kidney Failure <15    CBC & Differential [[id number]] Collected:  01/19/18 0426    Specimen:  Blood Updated:  01/19/18 0543    Narrative:       The following orders were created for panel order " CBC & Differential.  Procedure                               Abnormality         Status                     ---------                               -----------         ------                     CBC Auto Differential[[id number]]        Abnormal            Final result                 Please view results for these tests on the individual orders.    CBC Auto Differential [[id number]]  (Abnormal) Collected:  01/19/18 0426    Specimen:  Blood Updated:  01/19/18 0543     WBC 8.89 10*3/mm3      RBC 3.86 (L) 10*6/mm3      Hemoglobin 12.0 (L) g/dL      Hematocrit 37.9 (L) %      MCV 98.2 (H) fL      MCH 31.1 pg      MCHC 31.7 (L) g/dL      RDW 13.1 %      RDW-SD 47.1 fl      MPV 10.7 fL      Platelets 178 10*3/mm3      Neutrophil % 68.3 %      Lymphocyte % 20.7 %      Monocyte % 10.5 %      Eosinophil % 0.2 (L) %      Basophil % 0.1 %      Immature Grans % 0.2 %      Neutrophils, Absolute 6.07 10*3/mm3      Lymphocytes, Absolute 1.84 10*3/mm3      Monocytes, Absolute 0.93 10*3/mm3      Eosinophils, Absolute 0.02 10*3/mm3      Basophils, Absolute 0.01 10*3/mm3      Immature Grans, Absolute 0.02 10*3/mm3      nRBC 0.0 /100 WBC     Wound Culture - Wound, Leg, Right [[id number]] Collected:  01/18/18 1555    Specimen:  Wound from Leg, Right Updated:  01/19/18 0623     Gram Stain Result Occasional Gram positive cocci      Few (2+) WBCs per low power field    Wound Culture - Wound, Knee, Right [[id number]] Collected:  01/18/18 1545    Specimen:  Wound from Knee, Right Updated:  01/19/18 0634     Gram Stain Result No organisms seen      Rare (1+) WBCs per low power field    Wound Culture - Wound, Leg, Right [[id number]] Collected:  01/18/18 1556    Specimen:  Wound from Leg, Right Updated:  01/19/18 0643     Gram Stain Result No organisms seen      No WBCs per low power field    Tissue / Bone Culture - Tissue, Knee, Right [[id number]] Collected:  01/18/18 1550    Specimen:  Tissue from Knee, Right Updated:  01/19/18 0647     Gram Stain  Result No organisms seen      Rare (1+) WBCs per low power field          Xr Knee 1 Or 2 View Right    Result Date: 1/18/2018  Narrative: RIGHT KNEE X-RAY  HISTORY: Postop arthroplasty explantation.  Two x-rays of the right knee were acquired and show radiopaque spacer material between the femur and the tibia and between the femur and the patella. There is a drain in the suprapatellar pouch.      Impression: Status post removal of arthroplasty hardware with placement of cement spacer material.  This report was finalized on 1/18/2018 7:12 PM by Dr. Adan Ramirez MD.      Xr Chest Pa & Lateral    Result Date: 1/15/2018  Narrative: PA AND LATERAL CHEST  HISTORY:  Preop knee revision surgery.  COMPARISON: PA and lateral chest 09/08/2016  FINDINGS: The cardiomediastinal silhouette is within normal limits. The lungs appear clear and there is no evidence for pulmonary edema, pleural effusion or infiltrate. Endplate spurring is present in the thoracic spine.      Impression: No interval change or evidence for active disease in the chest.  This report was finalized on 1/15/2018 12:24 PM by Dr. Anthony Ladd MD.        S/p Right TKA Resection Arthroplasty with abx spacer placement  PT-TTWB with walker and Immobilizer  ASA for DVT prophylaxis  On IV Vancomycin, await cultures, will consult Infectious Disease

## 2018-01-19 NOTE — CONSULTS
"Referring Provider: Wally Ventura MD  [address] [facility]  [address]  [place] [address]    Reason for Consultation: R knee PJI    History of present illness:  Mr Bernstein is a 61 YO who I am asked to evaluate and give opinion for R knee PJI. History is obtained from the patient, his wife, and review of the old medical records which I summarize/synthesize as follows: He had his R knee replaced in September 2016 and said he had a prolonged pain syndrome that lasted about 4 months. He says it took a while for the swelling to go down and for him to be able to straighten his knee. He says that his incision healed very well and quickly and was never draining. Unfortunately about 1 week ago he noticed a \"pimple\" like area on the medial portion of his knee that was draining some purulent fluid. He was seen by his orthopedist and found to have an elevated CRP. He reports dull constant pain worse w/ movement. No associated fevers. No alleviating factors. Therefore on 1/18/18 he underwent removal of prosthesis and placement of an antibiotic spacer. His operative gram stain has GPCs in 2 of the 4 cultures. He is currently on vancomycin.     PMH:  OA  GERD  Tobacco abuse  Hemochromatosis  REENA  BPPV  Nasal septum surgery  Rotator cuff repair  R TKA in 2012  R TKA removal and spacer placement 1/18/18    Social History:    Worked making tomato sauce  Now disabled  Quit smoking 9/2017    Family History:  No 1st degree relatives w/ bone or joint infections    Allergies:  No Antibiotic Allergies    Medications:    Current Facility-Administered Medications:   •  acetaminophen (TYLENOL) tablet 1,000 mg, 1,000 mg, Oral, Q6H PRN, Wally Ventura MD  •  acetaminophen (TYLENOL) tablet 325 mg, 325 mg, Oral, Q4H PRN, Wally Ventura MD  •  aspirin EC tablet 325 mg, 325 mg, Oral, BID With Meals, Wally Ventura MD, 325 mg at 01/19/18 0858  •  bisacodyl (DULCOLAX) suppository 10 mg, 10 mg, Rectal, Daily PRN, Wally Ventura MD  •  " diazePAM (VALIUM) tablet 5 mg, 5 mg, Oral, Q6H PRN, Wally Ventura MD  •  docusate sodium (COLACE) capsule 100 mg, 100 mg, Oral, BID PRN, Wally Ventura MD, 100 mg at 01/18/18 2228  •  ferrous sulfate tablet 325 mg, 325 mg, Oral, Daily With Breakfast, Wally Ventura MD, 325 mg at 01/19/18 0858  •  ketorolac (TORADOL) injection 15 mg, 15 mg, Intravenous, Q8H PRN, Wally Ventura MD  •  magnesium hydroxide (MILK OF MAGNESIA) suspension 2400 mg/10mL 10 mL, 10 mL, Oral, Daily PRN, Wally Ventura MD  •  meclizine (ANTIVERT) tablet 25 mg, 25 mg, Oral, TID PRN, Wally Ventura MD  •  melatonin tablet 5 mg, 5 mg, Oral, Nightly PRN, Wally Ventura MD  •  morphine injection 6 mg, 6 mg, Intravenous, Q2H PRN **AND** naloxone (NARCAN) injection 0.4 mg, 0.4 mg, Intravenous, Q5 Min PRN, Wally Ventura MD  •  mupirocin (BACTROBAN) 2 % nasal ointment, , Each Nare, BID, Wally Ventura MD  •  ondansetron (ZOFRAN) tablet 4 mg, 4 mg, Oral, Q6H PRN **OR** ondansetron ODT (ZOFRAN-ODT) disintegrating tablet 4 mg, 4 mg, Oral, Q6H PRN **OR** ondansetron (ZOFRAN) injection 4 mg, 4 mg, Intravenous, Q6H PRN, Wally Ventura MD, 4 mg at 01/18/18 2229  •  oxyCODONE-acetaminophen (PERCOCET)  MG per tablet 1 tablet, 1 tablet, Oral, Q4H PRN, Wally Ventura MD  •  oxyCODONE-acetaminophen (PERCOCET)  MG per tablet 2 tablet, 2 tablet, Oral, Q4H PRN, Wally Ventura MD, 2 tablet at 01/19/18 0958  •  Pharmacy to dose vancomycin, , Does not apply, Continuous PRN, Wally Ventura MD  •  sennosides-docusate sodium (SENOKOT-S) 8.6-50 MG tablet 2 tablet, 2 tablet, Oral, BID PRN, Wally Ventura MD, 2 tablet at 01/19/18 0028  •  sodium chloride 0.45 % infusion, 100 mL/hr, Intravenous, Continuous, Wally Ventura MD, Last Rate: 100 mL/hr at 01/19/18 0034, 100 mL/hr at 01/19/18 0034  •  sodium chloride 0.9 % flush 1-10 mL, 1-10 mL, Intravenous, PRN, Wally Ventura MD  •  vancomycin 1750 mg/500 mL 0.9% NS IVPB ([facility]), 15 mg/kg,  Intravenous, Q12H, Wally Ventura MD, 1,750 mg at 01/19/18 1213      Review of Systems  All systems were reviewed and are negative unless otherwise stated above in the HPI    Objective   Vital Signs   Temp:  [97.1 °F (36.2 °C)-99.1 °F (37.3 °C)] 98.7 °F (37.1 °C)  Heart Rate:  [74-96] 86  Resp:  [14-20] 14  BP: (104-143)/() 114/65    Physical Exam:   General: awake, alert, NAD   Head: Normocephalic, atraumatic  Eyes: PERRL, EOMI, no scleral icterus, no conjunctival pallor, no conjunctival hemorrhages.   ENT: MM dry, OP clear, no thrush. Poor  dentition.   Neck: Supple, no visible thyromegaly  Cardiovascular: NR, RR, no murmurs, rubs, or gallops; no LE edema  Respiratory: Lungs are clear to ascultation bilaterally, no rales or wheezing; normal work of breathing on ambient air  GI: Abdomen is soft, non-tender, non-distended, normal bowel sounds in all four quadrants; no hepatosplenomegaly, no masses palpated  : no Ledesma catheter present  Musculoskeletal: R knee bandaged; no other joint abnormalities, normal musculature  Skin: No rashes, lesions, or embolic phenomenon  Neurological: Alert and oriented x 3, cranial nerves 2-12 grossly intact, motor strength 5/5 in upper extremities  Psychiatric: Normal mood and affect   Lymph: no pre-auricular, post-auricular, submandibular, cervical, supraclavicular  LAD  Vasc: no cyanosis; PIV w/o erythema    Labs:     Lab Results   Component Value Date    WBC 8.89 01/19/2018    HGB 12.0 (L) 01/19/2018    HCT 37.9 (L) 01/19/2018    MCV 98.2 (H) 01/19/2018     01/19/2018       Lab Results   Component Value Date    GLUCOSE 123 (H) 01/19/2018    BUN 12 01/19/2018    CREATININE 0.85 01/19/2018    EGFRIFNONA 91 01/19/2018    EGFRIFAFRI  09/08/2016      Comment:      <15 Indicative of kidney failure.    BCR 14.1 01/19/2018    CO2 25.1 01/19/2018    CALCIUM 8.7 01/19/2018    ALBUMIN 4.10 01/15/2018    LABIL2 0.9 01/15/2018    AST 16 01/15/2018    ALT 15 01/15/2018   No  results found for: VANCOPEAK, VANCOTROUGH, VANCORANDOM    Lab Results   Component Value Date    CRP 3.11 (H) 01/15/2018     Lab Results   Component Value Date    SEDRATE 68 (H) 01/15/2018       Microbiology:  1/18 Right Leg Cx: 2 of 4 cultures with GPC    Radiology (personally reviewed images/report):  XR knee with new spacer present and arthroplasty removed    Assessment/Plan   1. 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 ID will follow.

## 2018-01-19 NOTE — PROGRESS NOTES
Discharge Planning Assessment  [facility]     Patient Name: Rafael Bernstein  MRN: [id number]  Today's Date: 1/19/2018    Admit Date: 1/18/2018          Discharge Needs Assessment       01/19/18 1043    Living Environment    Lives With spouse    Living Arrangements house    Home Accessibility bed and bath on same level    Number of Stairs to Enter Home 4    Number of Stairs Within Home --   has a flight of stairs to upstairs but will not be using them--everything he needs is on the main level of the home    Stair Railings at Home inside, present at both sides    Type of Financial/Environmental Concern none    Transportation Available car;family or friend will provide    Living Environment    Provides Primary Care For no one, unable/limited ability to care for self    Primary Care Provided By spouse/significant other    Quality Of Family Relationships supportive    Able to Return to Prior Living Arrangements yes    Discharge Needs Assessment    Concerns To Be Addressed discharge planning concerns;adjustment to diagnosis/illness concerns    Readmission Within The Last 30 Days no previous admission in last 30 days    Outpatient/Agency/Support Group Needs homecare agency (specify level of care)    Community Agency Name(S) has used [facility] in the past and states if IV antibiotics are needed at DC he would like to use them again.     Anticipated Changes Related to Illness inability to care for self    Equipment Currently Used at Home walker, rolling;cane, straight    Equipment Needed After Discharge commode   states he could benefit from a 3-in-1 commode    Discharge Facility/Level Of Care Needs home with home health    Current Discharge Risk physical impairment    Discharge Disposition still a patient            Discharge Plan       01/19/18 1047    Case Management/Social Work Plan    Plan Home via private auto with [facility] to follow if IV antibiotics needed at DC    Patient/Family In Agreement With Plan yes    Additional  Comments Introduced self/explained role of CCP. Face sheet data confirmed. Confirmed PCP and pharmacy. States he is on disability and this is his 2nd knee surgery. States he was told he will not be doing PT for several weeks but he may need antibiotics at DC. States he would like to use [facility]. Referral called to [facility] to follow. Discussed other equipment needs and he feels he would benefit from a 3-in-1 commode. Note left in EPIC for MD to order before DC. Plans are to go home with family to assist and [facility] to follow if IV antibiotics needed. CCP to follow............JW        Discharge Placement     No information found                Demographic Summary       01/19/18 1035    Referral Information    Admission Type inpatient    Arrived From admitted as an inpatient;home or self-care    Referral Source admission list    Reason For Consult discharge planning    Record Reviewed medical record    Contact Information    Permission Granted to Share Information With ;family/designee            Functional Status       01/19/18 1035    Functional Status Current    Ambulation 3-->assistive equipment and person    Transferring 3-->assistive equipment and person    Toileting 3-->assistive equipment and person    Bathing 3-->assistive equipment and person    Dressing 2-->assistive person    Eating 0-->independent    Communication 0-->understands/communicates without difficulty    Swallowing (if score 2 or more for any item, consult Rehab Services) 0-->swallows foods/liquids without difficulty    Current Functional Level Comment general post op weakness---encouraged pt to call for staff assist for needs    Change in Functional Status Since Onset of Current Illness/Injury yes    Functional Status Prior    Ambulation 1-->assistive equipment    Transferring 1-->assistive equipment    Toileting 0-->independent    Bathing 0-->independent    Dressing 0-->independent    Eating 0-->independent    Communication  0-->understands/communicates without difficulty    Swallowing 0-->swallows foods/liquids without difficulty    IADL    Medications independent    Meal Preparation independent    Housekeeping independent    Laundry independent    Shopping independent    Oral Care independent    Activity Tolerance    Current Activity Limitations non-weight bearing, lower extremity right   pt reports NWB Rt leg    Usual Activity Tolerance good    Current Activity Tolerance fair    Cognitive/Perceptual/Developmental    Current Mental Status/Cognitive Functioning no deficits noted            Psychosocial     None            Abuse/Neglect     None            Legal     None            Substance Abuse     None            Patient Forms     None          Martha Gagnon, RN

## 2018-01-19 NOTE — PROGRESS NOTES
Continued Stay Note  [facility]     Patient Name: Rafael Bernstein  MRN: [id number]  Today's Date: 1/19/2018    Admit Date: 1/18/2018          Discharge Plan       01/19/18 1315    Case Management/Social Work Plan    Plan Home with [facility] if IV antibiotics are needed    Patient/Family In Agreement With Plan yes    Additional Comments Vmm from [facility] [facility] and they can accept the patient. Per notes patient will need 6 weeks IV antbiotics. Stop date 3/2/18.      01/19/18 1122    Case Management/Social Work Plan    Plan Home with [facility] if IV ATB needed    Patient/Family In Agreement With Plan yes    Additional Comments Inbound call from [facility]  and they are out of network with patient's insurance. Called to pt in room and he is agreeable to anyone who can accept his insurance. Called referral to [facility] and she thinks they can take but will make sure.       01/19/18 1047    Case Management/Social Work Plan    Plan Home via private auto with [facility] to follow if IV antibiotics needed at DC    Patient/Family In Agreement With Plan yes    Additional Comments Introduced self/explained role of CCP. Face sheet data confirmed. Confirmed PCP and pharmacy. States he is on disability and this is his 2nd knee surgery. States he was told he will not be doing PT for several weeks but he may need antibiotics at DC. States he would like to use [facility]. Referral called to [facility] to follow. Discussed other equipment needs and he feels he would benefit from a 3-in-1 commode. Note left in EPIC for MD to order before DC. Plans are to go home with family to assist and [facility] to follow if IV antibiotics needed. CCP to follow............JW              Discharge Codes     None            Lanny Pereira, RN

## 2018-01-19 NOTE — DISCHARGE PLACEMENT REQUEST
"Rafael Bernstein (62 y.o. Male)     Date of Birth Social Security Number Address Home Phone MRN    1955  [address] [place], [place] [address] [phone] [id number]    M[employer]on Marital Status          Latter-day        Admission Date Admission Type Admitting Provider Attending Provider Department, Room/Bed    1/18/18 Elective Wally Ventura MD Goodin, Robert A, MD [address], 76/1    Discharge Date Discharge Disposition Discharge Destination                      Attending Provider: Wally Ventura MD     Allergies:  No Active Allergies    Isolation:  None   Infection:  None   Code Status:  FULL    Ht:  177.8 cm (70\")   Wt:  114 kg (250 lb 8 oz)    Admission Cmt:  None   Principal Problem:  None                Active Insurance as of 1/18/2018     Primary Coverage     Payor Plan Insurance Group Employer/Plan Group    [facility] [id number]     Payor Plan Address Payor Plan Phone Number Effective From Effective To    PO   1/1/2018     [place] [address]       Subscriber Name Subscriber Birth Date Member ID       RAFAEL BERNSTEIN 1955 [id number]                 Emergency Contacts      (Rel.) Home Phone Work Phone Mobile Phone    Amanda Bernstein (Spouse) [phone] -- [phone]              "

## 2018-01-19 NOTE — ANESTHESIA POSTPROCEDURE EVALUATION
"Patient: Rafael Bernstein    Procedure Summary     Date Anesthesia Start Anesthesia Stop Room / Location    01/18/18 1509 1827  [facility] / [facility]       Procedure Diagnosis Surgeon Provider    RIGHT TOTAL KNEE ARTHROPLASTY REVISION WITH ANTIBIOTIC SPACER (Right Knee) No diagnosis on file. MD Kevin Salmon MD          Anesthesia Type: general  Last vitals  BP   112/99 (01/18/18 1900)   Temp   36.4 °C (97.5 °F) (01/18/18 1824)   Pulse   87 (01/18/18 1900)   Resp   18 (01/18/18 1900)     SpO2   95 % (01/18/18 1900)     Post Anesthesia Care and Evaluation    Patient location during evaluation: bedside  Patient participation: complete - patient participated  Level of consciousness: awake and alert  Pain management: adequate  Airway patency: patent  Anesthetic complications: No anesthetic complications    Cardiovascular status: acceptable  Respiratory status: acceptable  Hydration status: acceptable    Comments: /99  Pulse 87  Temp 36.4 °C (97.5 °F) (Oral)   Resp 18  Ht 177.8 cm (70\")  Wt 114 kg (250 lb 8 oz)  SpO2 95%  BMI 35.94 kg/m2      "

## 2018-01-19 NOTE — PROGRESS NOTES
Continued Stay Note  [facility]     Patient Name: Rafael Bernstein  MRN: [id number]  Today's Date: 1/19/2018    Admit Date: 1/18/2018          Discharge Plan       01/19/18 1122    Case Management/Social Work Plan    Plan Home with [facility] if IV ATB needed    Patient/Family In Agreement With Plan yes    Additional Comments Inbound call from [facility]  and they are out of network with patient's insurance. Called to pt in room and he is agreeable to anyone who can accept his insurance. Called referral to [place] and she thinks they can take but will make sure.       01/19/18 1047    Case Management/Social Work Plan    Plan Home via private auto with [facility] to follow if IV antibiotics needed at DC    Patient/Family In Agreement With Plan yes    Additional Comments Introduced self/explained role of CCP. Face sheet data confirmed. Confirmed PCP and pharmacy. States he is on disability and this is his 2nd knee surgery. States he was told he will not be doing PT for several weeks but he may need antibiotics at DC. States he would like to use [facility]. Referral called to [facility] to follow. Discussed other equipment needs and he feels he would benefit from a 3-in-1 commode. Note left in EPIC for MD to order before DC. Plans are to go home with family to assist and [facility] to follow if IV antibiotics needed. CCP to follow............JW              Discharge Codes     None            Lanny Pereira RN

## 2018-01-19 NOTE — PLAN OF CARE
Problem: Patient Care Overview (Adult)  Goal: Plan of Care Review  Outcome: Ongoing (interventions implemented as appropriate)   01/19/18 0230 01/19/18 0937 01/19/18 1732   Coping/Psychosocial Response Interventions   Plan Of Care Reviewed With --  patient --    Patient Care Overview   Progress improving --  --    Outcome Evaluation   Outcome Summary/Follow up Plan --  --  pt doing well working with therapy. vss. KI in place. vss. pain is controlled wiht PO pain medication. family at bedside. PICC placed today. dressing c/d/i, NVI. pt voiding without difficulty. pt demonstrated use of IS. discussed with pt the importance of o2 monitoring, c/pap with H/O sleep apnea.      Goal: Adult Individualization and Mutuality  Outcome: Ongoing (interventions implemented as appropriate)    Goal: Discharge Needs Assessment  Outcome: Ongoing (interventions implemented as appropriate)      Problem: Perioperative Period (Adult)  Goal: Signs and Symptoms of Listed Potential Problems Will be Absent or Manageable (Perioperative Period)  Outcome: Ongoing (interventions implemented as appropriate)      Problem: Fall Risk (Adult)  Goal: Identify Related Risk Factors and Signs and Symptoms  Outcome: Ongoing (interventions implemented as appropriate)    Goal: Absence of Falls  Outcome: Ongoing (interventions implemented as appropriate)      Problem: Knee Replacement, Total (Adult)  Goal: Signs and Symptoms of Listed Potential Problems Will be Absent or Manageable (Knee Replacement, Total)  Outcome: Ongoing (interventions implemented as appropriate)

## 2018-01-19 NOTE — PLAN OF CARE
Problem: Patient Care Overview (Adult)  Goal: Plan of Care Review   01/19/18 0937   Coping/Psychosocial Response Interventions   Plan Of Care Reviewed With patient   Outcome Evaluation   Outcome Summary/Follow up Plan Patient is a pleasant 62 y.o. male s/p Right Total Knee Arthroplasty Resection and Antibiotic Spacer Placement secondary to infection. Patient is independent with all ADLs at baseline and lives at home with wife- 4 steps to enter home. Partial TTWBing of R LE per Dr. Ventura with KI on at all times-no flexion. Assist x 1 required for all mobility. Patient able to take a few small hops from the bed to the chair with RW for UE support with CGA-Skyler. Patient will benefit from skilled PT services acutely to address deficits as able and improve level of independence.        Problem: Inpatient Physical Therapy  Goal: Bed Mobility Goal LTG- PT   01/19/18 0937   Bed Mobility PT LTG   Bed Mobility PT LTG, Date Established 01/19/18   Bed Mobility PT LTG, Time to Achieve 1 wk   Bed Mobility PT LTG, Activity Type all bed mobility   Bed Mobility PT LTG, [place] Level supervision required     Goal: Transfer Training Goal 1 LTG- PT   01/19/18 0937   Transfer Training PT LTG   Transfer Training PT LTG, Date Established 01/19/18   Transfer Training PT LTG, Time to Achieve 1 wk   Transfer Training PT LTG, Activity Type all transfers   Transfer Training PT LTG, [place] Level supervision required   Transfer Training PT LTG, Assist Device walker, rolling  (maintaining R TTWBing)     Goal: Gait Training Goal LTG- PT   01/19/18 0937   Gait Training PT LTG   Gait Training Goal PT LTG, Date Established 01/19/18   Gait Training Goal PT LTG, Time to Achieve 1 wk   Gait Training Goal PT LTG, [place] Level supervision required   Gait Training Goal PT LTG, Assist Device walker, rolling   Gait Training Goal PT LTG, Distance to Achieve 25     Goal: Stair Training Goal LTG- PT   01/19/18 0937   Stair Training PT LTG    Stair Training Goal PT LTG, Date Established 01/19/18   Stair Training Goal PT LTG, Time to Achieve 1 wk   Stair Training Goal PT LTG, Number of Steps 4   Stair Training Goal PT LTG, [place] Level contact guard assist   Stair Training Goal PT LTG, Assist Device 1 handrail  (maintaining TTWBing)

## 2018-01-19 NOTE — THERAPY EVALUATION
Acute Care - Physical Therapy Initial Evaluation  [facility]     Patient Name: Rafael Bernstein  : 1955  MRN: [id number]  Today's Date: 2018   Onset of Illness/Injury or Date of Surgery Date: 18 (s/p Right Total Knee Arthroplasty Resection w Spacer)  Date of Referral to PT: 18  Referring Physician: Dr. Ventura      Admit Date: 2018     Visit Dx:    ICD-10-CM ICD-9-CM   1. Impaired functional mobility, balance, and endurance Z74.09 V49.89   2. Infection of right knee M00.9 711.96     Patient Active Problem List   Diagnosis   • OA (osteoarthritis) of knee     Past Medical History:   Diagnosis Date   • Arthritis    • Benign positional vertigo    • CHF (congestive heart failure)        • Colon polyps    • GERD (gastroesophageal reflux disease)    • Hemochromatosis    • Hemochromatosis    • History of mononucleosis    • Hyperlipidemia    • Internal hemorrhoids    • Sleep apnea     DOES NOT USE MACHINE   • SOB (shortness of breath)      Past Surgical History:   Procedure Laterality Date   • COLONOSCOPY  2014    torts, polyps, IH   • NASAL SEPTUM SURGERY     • REPLACEMENT TOTAL KNEE Right    • ROTATOR CUFF REPAIR Left     Dr. Lalo Lin   • VASECTOMY            PT ASSESSMENT (last 72 hours)      PT Evaluation       18 0900 18 0647    Rehab Evaluation    Document Type evaluation  -MA     Subjective Information agree to therapy;complains of;fatigue;pain  -MA     Patient Effort, Rehab Treatment adequate  -MA     Symptoms Noted During/After Treatment fatigue;increased pain  -MA     General Information    Patient Profile Review yes  -MA     Onset of Illness/Injury or Date of Surgery Date 18   s/p Right Total Knee Arthroplasty Resection w Spacer  -MA     Referring Physician Dr. Ventura  -MA     General Observations supine in bed with HOB elevated, R KI, no acute distress noted at rest  -MA     Pertinent History Of Current Problem R TKA on -infection. s/p  Right Total Knee Arthroplasty Resection and Antibiotic Spacer Placement  -MA     Precautions/Limitations fall precautions;brace on when up   partial TTWBing of R LE  -MA     Prior Level of Function independent:;all household mobility  -MA     Equipment Currently Used at Home  walker, standard;cane, straight  -QN    Plans/Goals Discussed With patient;agreed upon  -MA     Benefits Reviewed patient:;improve function;increase independence  -MA     Clinical Impression    Date of Referral to PT 01/19/18  -MA     PT Diagnosis impaired funct mobility 2' post op  -MA     Criteria for Skilled Therapeutic Interventions Met yes;treatment indicated  -MA     Pathology/Pathophysiology Noted (Describe Specifically for Each System) musculoskeletal  -MA     Impairments Found (describe specific impairments) aerobic capacity/endurance;gait, locomotion, and balance  -MA     Rehab Potential good, to achieve stated therapy goals  -MA     Vital Signs    O2 Delivery Pre Treatment room air  -MA     Pain Assessment    Pain Assessment 0-10  -MA     Pain Score 4  -MA     Post Pain Score 5  -MA     Pain Type Surgical pain  -MA     Pain Location Knee  -MA     Pain Orientation Right  -MA     Pain Intervention(s) Cold applied;Repositioned;Ambulation/increased activity;Rest  -MA     Response to Interventions tolerated  -MA     Vision Assessment/Intervention    Visual Impairment WFL  -MA     Cognitive Assessment/Intervention    Current Cognitive/Communication Assessment functional  -MA     Orientation Status oriented x 4  -MA     Follows Commands/Answers Questions 100% of the time;able to follow single-step instructions  -MA     Personal Safety WNL/WFL;good awareness, safety precautions  -MA     Personal Safety Interventions fall prevention program maintained;gait belt;nonskid shoes/slippers when out of bed  -MA     ROM (Range of Motion)    General ROM Detail R LE in KI, L LE WFL  -MA     MMT (Manual Muscle Testing)    General MMT Assessment Detail R  LE post op weakness  -MA     Mobility Assessment/Training    Extremity Weight-Bearing Status right lower extremity  -MA     Right Lower Extremity Weight-Bearing toe touch weight-bearing  -MA     Bed Mobility, Assessment/Treatment    Bed Mobility, Assistive Device bed rails;head of bed elevated  -MA     Bed Mob, Supine to Sit, [place] minimum assist (75% patient effort)  -MA     Bed Mob, Sit to Supine, [place] not tested  -MA     Bed Mobility, Safety Issues decreased use of legs for bridging/pushing;impaired trunk control for bed mobility  -MA     Bed Mobility, Impairments pain;strength decreased  -MA     Bed Mobility, Comment Assist with R LE when sitting EOB  -MA     Transfer Assessment/Treatment    Transfers, Sit-Stand [place] contact guard assist  -MA     Transfers, Stand-Sit [place] contact guard assist  -MA     Transfers, Sit-Stand-Sit, Assist Device rolling walker  -MA     Transfer, Maintain Weight Bearing Status cues to maintain weight bearing status;able to maintain weight bearing status  -MA     Transfer, Safety Issues sequencing ability decreased;step length decreased;weight-shifting ability decreased  -MA     Transfer, Impairments pain;strength decreased;impaired balance;ROM decreased  -MA     Transfer, Comment Cues for hand placement prior to transfer  -MA     Gait Assessment/Treatment    Gait, [place] Level contact guard assist;minimum assist (75% patient effort)  -MA     Gait, Assistive Device rolling walker  -MA     Gait, Distance (Feet) 3   5 small hops from bed to chair  -MA     Gait, Gait Pattern Analysis 2-point gait  -MA     Gait, Maintain Weight Bearing Status able to maintain weight bearing status;cues to maintain weight bearing status  -MA     Gait, Safety Issues balance decreased during turns;sequencing ability decreased  -MA     Gait, Impairments pain;strength decreased;impaired balance;ROM decreased  -MA     Gait, Comment Good performance with scooting and pivoting  to recliner while maintaining WB status.  -MA     Therapy Exercises    Right Lower Extremity AROM:;10 reps;supine;ankle pumps/circles;quad sets;hip abduction/adduction;SLR  -MA     Positioning and Restraints    Pre-Treatment Position in bed  -MA     Post Treatment Position chair  -MA     In Chair notified nsg;sitting;call light within reach;encouraged to call for assist;legs elevated;R knee immobilizer  -MA       01/19/18 0230 01/18/18 1221    General Information    Equipment Currently Used at Home walker, standard  -QN none  -KR    Living Environment    Lives With  spouse  -KR    Living Arrangements  house  -KR    Home Accessibility  no concerns  -KR    Transportation Available car;family or friend will provide  -QN car;family or friend will provide  -KR      User Key  (r) = Recorded By, (t) = Taken By, (c) = Cosigned By    Initials Name Provider Type    COURTNEY Dixon RN Registered Nurse    PARVIZ Elias RN Registered Nurse    NASREEN Heredia, PT Physical Therapist          Physical Therapy Education     Title: PT OT SLP Therapies (Done)     Topic: Physical Therapy (Done)     Point: Mobility training (Done)    Learning Progress Summary    Learner Readiness Method Response Comment Documented by Status   Patient Acceptance E [facility] 01/19/18 0942 Done               Point: Home exercise program (Done)    Learning Progress Summary    Learner Readiness Method Response Comment Documented by Status   Patient Acceptance E [facility] 01/19/18 0942 Done               Point: Body mechanics (Done)    Learning Progress Summary    Learner Readiness Method Response Comment Documented by Status   Patient Acceptance E [facility] 01/19/18 0942 Done               Point: Precautions (Done)    Learning Progress Summary    Learner Readiness Method Response Comment Documented by Status   Patient Acceptance E [facility] 01/19/18 0942 Done                      User Key     Initials Effective Dates Name Provider Type Roe DOWNEY  12/13/16 -  Sarita Heredia, PT Physical Therapist PT                PT Recommendation and Plan  Anticipated Discharge Disposition: home with assist, home with home health, skilled nursing facility (d/o patient progress made)  Planned Therapy Interventions: balance training, bed mobility training, gait training, home exercise program, patient/family education, postural re-education, stair training, strengthening, transfer training  PT Frequency: daily  Plan of Care Review  Plan Of Care Reviewed With: patient  Outcome Summary/Follow up Plan: Patient is a pleasant 62 y.o. male s/p Right Total Knee Arthroplasty Resection and Antibiotic Spacer Placement secondary to infection. Patient is independent with all ADLs at baseline and lives at home with wife- 4 steps to enter home. Partial TTWBing of R LE per Dr. Ventura with KI on at all times-no flexion. Assist x 1 required for all mobility. Patient able to take a few small hops from the bed to the chair with RW for UE support with CGA-Skyler. Patient will benefit from skilled PT services acutely to address deficits as able and improve level of independence.           IP PT Goals       01/19/18 0937          Bed Mobility PT LTG    Bed Mobility PT LTG, Date Established 01/19/18  -MA      Bed Mobility PT LTG, Time to Achieve 1 wk  -MA      Bed Mobility PT LTG, Activity Type all bed mobility  -MA      Bed Mobility PT LTG, [place] Level supervision required  -MA      Transfer Training PT LTG    Transfer Training PT LTG, Date Established 01/19/18  -MA      Transfer Training PT LTG, Time to Achieve 1 wk  -MA      Transfer Training PT LTG, Activity Type all transfers  -MA      Transfer Training PT LTG, [place] Level supervision required  -MA      Transfer Training PT LTG, Assist Device walker, rolling   maintaining R TTWBing  -MA      Gait Training PT LTG    Gait Training Goal PT LTG, Date Established 01/19/18  -MA      Gait Training Goal PT LTG, Time to Achieve 1 wk   -MA      Gait Training Goal PT LTG, [place] Level supervision required  -MA      Gait Training Goal PT LTG, Assist Device walker, rolling  -MA      Gait Training Goal PT LTG, Distance to Achieve 25  -MA      Stair Training PT LTG    Stair Training Goal PT LTG, Date Established 01/19/18  -MA      Stair Training Goal PT LTG, Time to Achieve 1 wk  -MA      Stair Training Goal PT LTG, Number of Steps 4  -MA      Stair Training Goal PT LTG, [place] Level contact guard assist  -MA      Stair Training Goal PT LTG, Assist Device 1 handrail   maintaining TTWBing  -MA        User Key  (r) = Recorded By, (t) = Taken By, (c) = Cosigned By    Initials Name Provider Type    NASREEN Heredia PT Physical Therapist                Outcome Measures       01/19/18 0900          How much help from another person do you currently need...    Turning from your back to your side while in flat bed without using bedrails? 3  -MA      Moving from lying on back to sitting on the side of a flat bed without bedrails? 3  -MA      Moving to and from a bed to a chair (including a wheelchair)? 2  -MA      Standing up from a chair using your arms (e.g., wheelchair, bedside chair)? 2  -MA      Climbing 3-5 steps with a railing? 1  -MA      To walk in hospital room? 1  -MA      AM-PAC 6 Clicks Score 12  -MA      Functional Assessment    Outcome Measure Options AM-PAC 6 Clicks Basic Mobility (PT)  -MA        User Key  (r) = Recorded By, (t) = Taken By, (c) = Cosigned By    Initials Name Provider Type    NASREEN Heredia PT Physical Therapist           Time Calculation:         PT Charges       01/19/18 0930 01/19/18 0910       Time Calculation    Start Time 0905  -MA 0905  -MA     Stop Time 0930  -MA      Time Calculation (min) 25 min  -MA      PT Received On 01/19/18  -MA      PT - Next Appointment 01/20/18  -MA      PT Goal Re-Cert Due Date 01/26/18  -MA        User Key  (r) = Recorded By, (t) = Taken By, (c) = Cosigned By     Initials Name Provider Type    NASREEN Heredia PT Physical Therapist          Therapy Charges for Today     Code Description Service Date Service Provider Modifiers Qty    36519985337 HC PT EVAL MOD COMPLEXITY 2 1/19/2018 Sarita Heredia, PT GP 1    25292603808 HC PT THER PROC EA 15 MIN 1/19/2018 Sarita Heredia, PT GP 1          PT G-Codes  Outcome Measure Options: AM-PAC 6 Clicks Basic Mobility (PT)      Sarita Heredia PT  1/19/2018

## 2018-01-20 LAB
CREAT BLD-MCNC: 0.83 MG/DL (ref 0.76–1.27)
CRP SERPL-MCNC: 22.73 MG/DL (ref 0–0.5)
DEPRECATED RDW RBC AUTO: 46.1 FL (ref 37–54)
ERYTHROCYTE [DISTWIDTH] IN BLOOD BY AUTOMATED COUNT: 12.9 % (ref 11.5–14.5)
GFR SERPL CREATININE-BSD FRML MDRD: 94 ML/MIN/1.73
HCT VFR BLD AUTO: 31 % (ref 40.4–52.2)
HGB BLD-MCNC: 10 G/DL (ref 13.7–17.6)
MCH RBC QN AUTO: 31.4 PG (ref 27–32.7)
MCHC RBC AUTO-ENTMCNC: 32.3 G/DL (ref 32.6–36.4)
MCV RBC AUTO: 97.5 FL (ref 79.8–96.2)
PLATELET # BLD AUTO: 162 10*3/MM3 (ref 140–500)
PMV BLD AUTO: 10.8 FL (ref 6–12)
RBC # BLD AUTO: 3.18 10*6/MM3 (ref 4.6–6)
VANCOMYCIN TROUGH SERPL-MCNC: 11.3 MCG/ML (ref 5–20)
WBC NRBC COR # BLD: 7.75 10*3/MM3 (ref 4.5–10.7)

## 2018-01-20 PROCEDURE — 85027 COMPLETE CBC AUTOMATED: CPT | Performed by: INTERNAL MEDICINE

## 2018-01-20 PROCEDURE — 99232 SBSQ HOSP IP/OBS MODERATE 35: CPT | Performed by: INTERNAL MEDICINE

## 2018-01-20 PROCEDURE — 25010000002 VANCOMYCIN 10 G RECONSTITUTED SOLUTION: Performed by: ORTHOPAEDIC SURGERY

## 2018-01-20 PROCEDURE — 97110 THERAPEUTIC EXERCISES: CPT | Performed by: PHYSICAL THERAPIST

## 2018-01-20 PROCEDURE — 82565 ASSAY OF CREATININE: CPT | Performed by: ORTHOPAEDIC SURGERY

## 2018-01-20 PROCEDURE — 86140 C-REACTIVE PROTEIN: CPT | Performed by: INTERNAL MEDICINE

## 2018-01-20 PROCEDURE — 80202 ASSAY OF VANCOMYCIN: CPT | Performed by: ORTHOPAEDIC SURGERY

## 2018-01-20 PROCEDURE — 25010000002 VANCOMYCIN: Performed by: ORTHOPAEDIC SURGERY

## 2018-01-20 RX ORDER — OXYCODONE AND ACETAMINOPHEN 10; 325 MG/1; MG/1
1-2 TABLET ORAL EVERY 4 HOURS PRN
Qty: 80 TABLET | Refills: 0 | Status: SHIPPED | OUTPATIENT
Start: 2018-01-20 | End: 2018-03-02 | Stop reason: HOSPADM

## 2018-01-20 RX ORDER — POLYETHYLENE GLYCOL 3350 17 G/17G
17 POWDER, FOR SOLUTION ORAL DAILY PRN
Status: DISCONTINUED | OUTPATIENT
Start: 2018-01-20 | End: 2018-01-21 | Stop reason: HOSPADM

## 2018-01-20 RX ADMIN — OXYCODONE HYDROCHLORIDE AND ACETAMINOPHEN 2 TABLET: 10; 325 TABLET ORAL at 08:01

## 2018-01-20 RX ADMIN — OXYCODONE HYDROCHLORIDE AND ACETAMINOPHEN 2 TABLET: 10; 325 TABLET ORAL at 13:54

## 2018-01-20 RX ADMIN — OXYCODONE HYDROCHLORIDE AND ACETAMINOPHEN 2 TABLET: 10; 325 TABLET ORAL at 18:09

## 2018-01-20 RX ADMIN — DOCUSATE SODIUM -SENNOSIDES 2 TABLET: 50; 8.6 TABLET, COATED ORAL at 20:59

## 2018-01-20 RX ADMIN — DOCUSATE SODIUM -SENNOSIDES 2 TABLET: 50; 8.6 TABLET, COATED ORAL at 08:01

## 2018-01-20 RX ADMIN — VANCOMYCIN HYDROCHLORIDE 1750 MG: 1 INJECTION, POWDER, LYOPHILIZED, FOR SOLUTION INTRAVENOUS at 00:21

## 2018-01-20 RX ADMIN — MAGNESIUM HYDROXIDE 10 ML: 2400 SUSPENSION ORAL at 17:20

## 2018-01-20 RX ADMIN — ASPIRIN 325 MG: 325 TABLET, COATED ORAL at 08:01

## 2018-01-20 RX ADMIN — DOCUSATE SODIUM 100 MG: 100 CAPSULE, LIQUID FILLED ORAL at 20:59

## 2018-01-20 RX ADMIN — POLYETHYLENE GLYCOL 3350 17 G: 17 POWDER, FOR SOLUTION ORAL at 19:03

## 2018-01-20 RX ADMIN — MUPIROCIN: 20 OINTMENT TOPICAL at 08:01

## 2018-01-20 RX ADMIN — OXYCODONE HYDROCHLORIDE AND ACETAMINOPHEN 2 TABLET: 10; 325 TABLET ORAL at 03:46

## 2018-01-20 RX ADMIN — VANCOMYCIN HYDROCHLORIDE 2000 MG: 10 INJECTION, POWDER, LYOPHILIZED, FOR SOLUTION INTRAVENOUS at 15:04

## 2018-01-20 RX ADMIN — ASPIRIN 325 MG: 325 TABLET, COATED ORAL at 17:19

## 2018-01-20 NOTE — THERAPY TREATMENT NOTE
Acute Care - Physical Therapy Treatment Note  [facility]     Patient Name: Rafael Bernstein  : 1955  MRN: [id number]  Today's Date: 2018  Onset of Illness/Injury or Date of Surgery Date: 18 (s/p Right Total Knee Arthroplasty Resection w Spacer)  Date of Referral to PT: 18  Referring Physician: Dr. Ventura    Admit Date: 2018    Visit Dx:    ICD-10-CM ICD-9-CM   1. Impaired functional mobility, balance, and endurance Z74.09 V49.89   2. Infection of right knee M00.9 711.96     Patient Active Problem List   Diagnosis   • OA (osteoarthritis) of knee               Adult Rehabilitation Note       18 0945          Rehab Assessment/Intervention    Discipline physical therapist  -      Document Type therapy note (daily note)  -      Subjective Information no complaints;agree to therapy  -KH      Patient Effort, Rehab Treatment good  -KH      Symptoms Noted During/After Treatment fatigue;increased pain  -KH      Precautions/Limitations fall precautions;non-weight bearing status;brace on when up   TTWBing of R LE  -KH      Recorded by [KH] Zoila Clarke, PT      Vital Signs    O2 Delivery Pre Treatment room air  -KH      Recorded by [KH] Zoila Clarke, PT      Pain Assessment    Pain Assessment 0-10  -KH      Pain Score 4  -KH      Post Pain Score 5  -KH      Pain Type Surgical pain  -      Pain Location Knee  -      Pain Orientation Right  -      Pain Intervention(s) Repositioned;Ambulation/increased activity  -KH      Response to Interventions tamiko  -KH      Recorded by [KH] Zoila Clarke, ANNA      Vision Assessment/Intervention    Visual Impairment WFL  -KH      Recorded by [KH] Zoila Clarke, PT      Cognitive Assessment/Intervention    Current Cognitive/Communication Assessment functional  -      Orientation Status oriented x 4  -KH      Follows Commands/Answers Questions 100% of the time  -KH      Personal Safety WNL/WFL  -KH      Personal Safety Interventions fall prevention  program maintained;gait belt;nonskid shoes/slippers when out of bed;supervised activity  -      Short/Long Term Memory intact short term memory;intact long term memory  -      Recorded by [KH] Zoila Clarke, PT      Mobility Assessment/Training    Extremity Weight-Bearing Status right lower extremity  -      Right Lower Extremity Weight-Bearing toe touch weight-bearing  -KH      Recorded by [KH] Zoila Clarke PT      Bed Mobility, Assessment/Treatment    Bed Mobility, Assistive Device bed rails;head of bed elevated  -      Bed Mob, Supine to Sit, [place] minimum assist (75% patient effort)  -      Bed Mob, Sit to Supine, [place] not tested  -      Bed Mobility, Safety Issues decreased use of legs for bridging/pushing;impaired trunk control for bed mobility  -      Bed Mobility, Impairments pain;strength decreased  -      Bed Mobility, Comment assist with R LE when scooting to the EOB. Patient able to lift trunk independently, just needs assistance with his leg  -      Recorded by [KH] Zoila Clarke PT      Transfer Assessment/Treatment    Transfers, Sit-Stand [place] minimum assist (75% patient effort)  -      Transfers, Stand-Sit [place] minimum assist (75% patient effort)  -      Transfers, Sit-Stand-Sit, Assist Device rolling walker  -      Transfer, Maintain Weight Bearing Status cues to maintain weight bearing status  -      Transfer, Safety Issues sequencing ability decreased;step length decreased;weight-shifting ability decreased  -      Transfer, Impairments pain;strength decreased;impaired balance;ROM decreased  -      Recorded by [KH] Zoila Clarke, PT      Gait Assessment/Treatment    Gait, [place] Level contact guard assist;minimum assist (75% patient effort)  -      Gait, Assistive Device rolling walker  -      Gait, Distance (Feet) 3   multiple small hops  -      Gait, Gait Pattern Analysis 2-point gait  -      Gait, Maintain Weight Bearing  Status able to maintain weight bearing status  -KH      Gait, Safety Issues balance decreased during turns;sequencing ability decreased  -KH      Gait, Impairments pain;strength decreased;impaired balance;ROM decreased  -KH      Gait, Comment Patient able to hop a few steps from bed to chair while maintaining NWB  -KH      Recorded by [JAYLEN] Zoila Clarke PT      Positioning and Restraints    Pre-Treatment Position in bed  -KH      Post Treatment Position chair  -KH      In Chair reclined;call light within reach;encouraged to call for assist  -KH      Recorded by [JAYLEN] Zoila Clarke PT        User Key  (r) = Recorded By, (t) = Taken By, (c) = Cosigned By    Initials Name Effective Dates    JAYLEN Clarke, PT 06/22/16 -                 IP PT Goals       01/19/18 0937          Bed Mobility PT LTG    Bed Mobility PT LTG, Date Established 01/19/18  -MA      Bed Mobility PT LTG, Time to Achieve 1 wk  -MA      Bed Mobility PT LTG, Activity Type all bed mobility  -MA      Bed Mobility PT LTG, [place] Level supervision required  -MA      Transfer Training PT LTG    Transfer Training PT LTG, Date Established 01/19/18  -MA      Transfer Training PT LTG, Time to Achieve 1 wk  -MA      Transfer Training PT LTG, Activity Type all transfers  -MA      Transfer Training PT LTG, [place] Level supervision required  -MA      Transfer Training PT LTG, Assist Device walker, rolling   maintaining R TTWBing  -MA      Gait Training PT LTG    Gait Training Goal PT LTG, Date Established 01/19/18  -MA      Gait Training Goal PT LTG, Time to Achieve 1 wk  -MA      Gait Training Goal PT LTG, [place] Level supervision required  -MA      Gait Training Goal PT LTG, Assist Device walker, rolling  -MA      Gait Training Goal PT LTG, Distance to Achieve 25  -MA      Stair Training PT LTG    Stair Training Goal PT LTG, Date Established 01/19/18  -MA      Stair Training Goal PT LTG, Time to Achieve 1 wk  -MA      Stair Training Goal PT LTG,  Number of Steps 4  -MA      Stair Training Goal PT LTG, [place] Level contact guard assist  -MA      Stair Training Goal PT LTG, Assist Device 1 handrail   maintaining TTWBing  -MA        User Key  (r) = Recorded By, (t) = Taken By, (c) = Cosigned By    Initials Name Provider Type    NASREEN Heredia PT Physical Therapist          Physical Therapy Education     Title: PT OT SLP Therapies (Done)     Topic: Physical Therapy (Done)     Point: Mobility training (Done)    Learning Progress Summary    Learner Readiness Method Response Comment Documented by Status   Patient Acceptance E [facility] 01/20/18 0949 Done    Acceptance E [facility] 01/19/18 0942 Done               Point: Home exercise program (Done)    Learning Progress Summary    Learner Readiness Method Response Comment Documented by Status   Patient Acceptance E [facility] 01/19/18 0942 Done               Point: Body mechanics (Done)    Learning Progress Summary    Learner Readiness Method Response Comment Documented by Status   Patient Acceptance E [facility] 01/20/18 0949 Done    Acceptance E [facility] 01/19/18 0942 Done               Point: Precautions (Done)    Learning Progress Summary    Learner Readiness Method Response Comment Documented by Status   Patient Acceptance E [facility] 01/20/18 0949 Done    Acceptance E [facility] 01/19/18 0942 Done                      User Key     Initials Effective Dates Name Provider Type [facility] 06/22/16 -  Zoila Clarke, PT Physical Therapist PT    MA 12/13/16 -  Sarita Heredia PT Physical Therapist PT                    PT Recommendation and Plan  Anticipated Discharge Disposition: home with assist, home with home health, skilled nursing facility (d/o patient progress made)  Planned Therapy Interventions: balance training, bed mobility training, gait training, home exercise program, patient/family education, postural re-education, stair training, strengthening, transfer training  PT Frequency: daily  Plan of Care  Review  Plan Of Care Reviewed With: patient  Progress: improving  Outcome Summary/Follow up Plan: Patient completed transfer more NWB than TTWB since yesterday he had some increased pain after placing even a little bit of weight through his R LE. Patient did well with hopping, mostly needed help with R LE to get out of bed          Outcome Measures       01/20/18 0900 01/19/18 0900       How much help from another person do you currently need...    Turning from your back to your side while in flat bed without using bedrails? 3  -KH 3  -MA     Moving from lying on back to sitting on the side of a flat bed without bedrails? 3  -KH 3  -MA     Moving to and from a bed to a chair (including a wheelchair)? 2  -KH 2  -MA     Standing up from a chair using your arms (e.g., wheelchair, bedside chair)? 2  -KH 2  -MA     Climbing 3-5 steps with a railing? 1  -KH 1  -MA     To walk in hospital room? 1  -KH 1  -MA     AM-PAC 6 Clicks Score 12  -KH 12  -MA     Functional Assessment    Outcome Measure Options AM-PAC 6 Clicks Basic Mobility (PT)  -KH AM-PAC 6 Clicks Basic Mobility (PT)  -MA       User Key  (r) = Recorded By, (t) = Taken By, (c) = Cosigned By    Initials Name Provider Type     Zoila Clarke PT Physical Therapist    NASREEN Heredia PT Physical Therapist           Time Calculation:         PT Charges       01/20/18 0945          Time Calculation    Start Time 0930  -      Stop Time 0945  -      Time Calculation (min) 15 min  -      PT Received On 01/20/18  -      PT - Next Appointment 01/21/18  -        User Key  (r) = Recorded By, (t) = Taken By, (c) = Cosigned By    Initials Name Provider Type     Zoila Clarke PT Physical Therapist          Therapy Charges for Today     Code Description Service Date Service Provider Modifiers Qty    25525752462 HC PT THER PROC EA 15 MIN 1/20/2018 Zoila Clarke PT GP 1    76380143076 HC PT THER SUPP EA 15 MIN 1/20/2018 Zoila Clarke PT GP 1          PT  G-Codes  Outcome Measure Options: AM-PAC 6 Clicks Basic Mobility (PT)    Zoila Clarke, PT  1/20/2018

## 2018-01-20 NOTE — PLAN OF CARE
Problem: Patient Care Overview (Adult)  Goal: Plan of Care Review  Outcome: Ongoing (interventions implemented as appropriate)   01/20/18 0979   Coping/Psychosocial Response Interventions   Plan Of Care Reviewed With patient   Patient Care Overview   Progress improving   Outcome Evaluation   Outcome Summary/Follow up Plan Patient completed transfer more NWB than TTWB since yesterday he had some increased pain after placing even a little bit of weight through his R LE. Patient did well with hopping, mostly needed help with R LE to get out of bed

## 2018-01-20 NOTE — PROGRESS NOTES
"Ortho POD 2    Patient Name:  Rafael Bernstein  YOB: 1955  Medical Records Number:  [id number]    No complaints except pain    /69 (BP Location: Left arm, Patient Position: Lying)  Pulse 87  Temp 98.2 °F (36.8 °C) (Oral)   Resp 18  Ht 177.8 cm (70\")  Wt 114 kg (250 lb 8 oz)  SpO2 94%  BMI 35.94 kg/m2    RLE:  NVI, calf nontender, sensation intact  No signs of DVT    Incision: clean, no infection    Lab Results (last 24 hours)     Procedure Component Value Units Date/Time    Wound Culture - Wound, Leg, Right [[id number]]  (Normal) Collected:  01/18/18 1556    Specimen:  Wound from Leg, Right Updated:  01/19/18 1046     Wound Culture Growth present, too young to evaluate     Gram Stain Result No organisms seen      No WBCs per low power field    CBC (No Diff) [[id number]]  (Abnormal) Collected:  01/20/18 0351    Specimen:  Blood Updated:  01/20/18 0422     WBC 7.75 10*3/mm3      RBC 3.18 (L) 10*6/mm3      Hemoglobin 10.0 (L) g/dL      Hematocrit 31.0 (L) %      MCV 97.5 (H) fL      MCH 31.4 pg      MCHC 32.3 (L) g/dL      RDW 12.9 %      RDW-SD 46.1 fl      MPV 10.8 fL      Platelets 162 10*3/mm3     Creatinine, Serum [[id number]]  (Normal) Collected:  01/20/18 0351    Specimen:  Blood Updated:  01/20/18 0429     Creatinine 0.83 mg/dL      eGFR Non African Amer 94 mL/min/1.73     C-reactive Protein [[id number]]  (Abnormal) Collected:  01/20/18 0351    Specimen:  Blood Updated:  01/20/18 0429     C-Reactive Protein 22.73 (H) mg/dL     Wound Culture - Wound, Knee, Right [[id number]]  (Normal) Collected:  01/18/18 1545    Specimen:  Wound from Knee, Right Updated:  01/20/18 0724     Wound Culture No growth at 2 days     Gram Stain Result No organisms seen      Rare (1+) WBCs per low power field    Tissue / Bone Culture - Tissue, Knee, Right [[id number]]  (Normal) Collected:  01/18/18 1550    Specimen:  Tissue from Knee, Right Updated:  01/20/18 0727     Tissue Culture No growth at 2 days     Gram " Stain Result No organisms seen      Rare (1+) WBCs per low power field          S/p Right TKA Resection Arthroplasty/abx spacer  TTWB with walker and Knee Immobilizer  D/C drain, leave FERNANDO for 7 days  ID following, IV Vanc for now, awaiting culture results  ASA for DVT prophylaxis  Home with home health when ok with ID and home IV abx organized

## 2018-01-20 NOTE — PROGRESS NOTES
"Pharmacokinetic Consult - Vancomycin Dosing (Follow-up Note)  Rafael Bernstein is on day 2 pharmacy to dose vancomycin for right knee arthroplasty infection.  Pharmacy dosing vancomycin per Dr. Ventura 's request. Dr. Hagen is following.  Goal trough: 15-20 mg/L   Treatment duration: 6 weeks with stop date of 3/2/18 per ID  Other antimicrobials: none  Admit date: 1/18/2018 11:51 AM    Relevant clinical data and objective history reviewed:  62 y.o. male 177.8 cm (70\") 114 kg (250 lb 8 oz)    Past Medical History:   Diagnosis Date   • Arthritis    • Benign positional vertigo    • CHF (congestive heart failure)     2012   • Colon polyps    • GERD (gastroesophageal reflux disease)    • Hemochromatosis    • Hemochromatosis    • History of mononucleosis    • Hyperlipidemia    • Internal hemorrhoids    • Sleep apnea     DOES NOT USE MACHINE   • SOB (shortness of breath)      Creatinine   Date Value Ref Range Status   01/20/2018 0.83 0.76 - 1.27 mg/dL Final   01/19/2018 0.85 0.76 - 1.27 mg/dL Final     BUN   Date Value Ref Range Status   01/19/2018 12 8 - 23 mg/dL Final     Estimated Creatinine Clearance: 116.7 mL/min (by C-G formula based on Cr of 0.83).    Lab Results   Component Value Date    WBC 7.75 01/20/2018    WBC 8.89 01/19/2018    WBC 6.43 01/15/2018     Temp Readings from Last 3 Encounters:   01/20/18 98.8 °F (37.1 °C) (Oral)   01/15/18 98.3 °F (36.8 °C) (Oral)   02/27/17 98.3 °F (36.8 °C) (Oral)     Baseline cultures/source/suscetibilit/labs/radiology:  1/18 Right leg wound Cx: GPC    Anti-Infectives     Ordered     Dose/Rate Route Frequency Start Stop    01/20/18 1205  vancomycin 2000 mg/500 mL 0.9% NS IVPB ([facility])     Ordering Provider:  Wally Ventura MD    2,000 mg  over 200 Minutes Intravenous Every 12 Hours 01/20/18 1300 03/02/18 0059    01/18/18 2249  vancomycin 1250 mg/250 mL 0.9% NS IVPB ([facility])     Ordering Provider:  Wally Ventura MD    1,250 mg  over 120 Minutes Intravenous Once 01/18/18 4697 " 01/19/18 0228    01/18/18 2228  Pharmacy to dose vancomycin     Ordering Provider:  Wally Ventura MD     Does not apply Continuous PRN 01/18/18 2227 03/01/18 2226    01/18/18 1204  ceFAZolin (ANCEF) in SWFI 2 g/20ml IV PUSH syringe     Ordering Provider:  Wally Ventura MD    2 g  over 5 Minutes Intravenous Once 01/18/18 1245 01/18/18 1525           Lab Results   Component Value Date    [facility] 11.30 01/20/2018     No results found for: VANCORANDOM    IV fluids: 1/2 NS at 100 mL/hr    Vancomycin dosing history:   1/18 1758 vancomycin 1 g iv once  1/19 0028 vancomycin 1250 mg iv once  1/19 vancomycin 1750 mg iv q12h    1/20 1058 trough = 11.3 mcg/mL    Assessment/Plan  1. Trough level is subtherapeutic. Level drawn ~10.5 hrs after the last dose and prior to the 4th. Will increase vancomycin to 2 g iv q12h.  2. Renal function is stable. Will continue to monitor renal function and draw vancomycin trough level on 1/21 prior to the 3rd dose to ensure appropriate accumulation.   3. Encourage hydration to prevent toxic accumulation; monitor for s/sx of toxicity including increase in SCr and decrease in UOP.    Pharmacy will continue to follow daily while on vancomycin and adjust as needed.     Thank you,    Gisselle Neil, Pharm.D.  01/20/18 12:06 PM

## 2018-01-20 NOTE — DISCHARGE SUMMARY
Discharge Summary    Patient Name:  Rafael Bernstein  YOB: 1955  Medical Records Number:  [id number]    Date of Admission:  1/18/2018  Date of Discharge:  1/21/2018    Primary Discharge Diagnosis:  OA (osteoarthritis) of knee [M17.10]    Secondary Discharge Diagnosis:    Problem List Items Addressed This Visit     None      Visit Diagnoses     Infection of right knee        Relevant Orders    Anaerobic Culture - Wound, Knee, Right    Wound Culture - Wound, Knee, Right (Completed)    Anaerobic Culture - Tissue, Knee, Right    Tissue / Bone Culture - Tissue, Knee, Right (Completed)    Anaerobic Culture - Wound, Leg, Right    Anaerobic Culture - Wound, Leg, Right    Wound Culture - Wound, Leg, Right (Completed)    Wound Culture - Wound, Leg, Right (Completed)          Procedures Performed:  Right Total Knee Arthroplasty Resection with abx spacer placement      Hospital Course:    Rafael Bernstein is a 62 y.o.  male who underwent successful right tka resection and abx spacer placement on 1/18/2018.  Rafael Bernstein was started on Aspirin 325mg po twice daily immediately post-operatively for DVT prophylaxis.  On post-op day 1 the patients dressing was changed and their incision was clean, with no signs of infection and their calf was soft, with no signs of DVT.  The patient progressed well with physical therapy and the patients hemoglobin remained stable. On post-operative day 3 the patient was felt ready for discharge.      Total Knee Joint Replacement Discharge Instructions:    I. ACTIVITIES:  1. Exercises:  ? During the day be up ambulating every 2 hours (while awake) for short distances  ? Complete the ankle pump exercises at least 10 times per hour (while awake)  ? Elevate legs most of the day the first week post operatively and thereafter elevate legs when in bed and for at least 30 minutes during the day. Caution must be taken to avoid pillow placement under the bend of the knee as this can led to  flexion contractures of the knee.  ? Use cold packs 20-30 minutes approximately 5 times per day. This should be done before and after completing your exercises and at any time you are experiencing pain/ stiffness in your operative extremity.      2. Activities of Daily Living:  ? No tub baths, hot tubs, or swimming pools for 4 weeks  ? May shower and let water run over the incision on post-operative day #5 if no drainage. Do not scrub or rub the incision. Simply let the water run over the incision and pat dry.    II. Restrictions  ? Do not cross legs or kneel  ? Your surgeon will discuss with you when you will be able to drive again.  ? Toe Touch Weight bearing with walker and knee immobilizer  ? First week stay inside on even terrain. May go up and down stairs one stair at a time utilizing the hand rail  ? After one week, you may venture outside.    III. Precautions:  ? Everyone that comes near you should wash their hands  ? No elective dental, genital-urinary, or colon procedures or surgical procedures for 12 weeks after surgery unless absolutely necessary.  ?  If dental work or surgical procedure is deemed absolutely necessary, you will need to contact your surgeon as you will need to take antibiotics 1 hour prior to any dental work (including teeth cleanings).  ? Please discuss with your surgeon prophylactic antibiotics as the length of time this intervention will be necessary for you varies with each patient’s health history and situation.  ? Avoid sick people. If you must be around someone who is ill, they should wear a mask.  ? Avoid visits to the Emergency Room or Urgent Care unless you are having a life threatening event.   ? If ordered stockings are to be placed on in the morning and removed at night. Monitor the stockings to ensure that any swelling is not causing the stockings to become too tight. In this case, remove stockings immediately.    IV. INCISION CARE:  ? Wash your hands prior to dressing  changes  ? Change the dressing as needed to keep incision clean and dry. Utilize dry gauze and paper tape. Avoid touching the side of the gauze that goes against the incision with your hands.  ? No creams or ointments to the incision  ? May remove dressing once the incision is free of drainage  ? Do not touch or pick at the incision  ? Check incision every day and notify surgeon immediately if any of the following signs or symptoms are noted:  o Increase in redness  o Increase in swelling around the incision and of the entire extremity  o Increase in pain  o Drainage oozing from the incision  o Pulling apart of the edges of the incision  o Increase in overall body temperature (greater than 100.5 degrees)  ? Your surgeon will instruct you regarding suture or staple removal    V. Medications:   1. Anticoagulants: You will be discharged on an anticoagulant. This is a prophylactic medication that helps prevent blood clots during your post-operative period. The type and length of dosage varies based on your individual needs, procedure performed, and surgeon’s preference.  ? While taking the anticoagulant, you should avoid taking any additional aspirin, ibuprofen (Advil or Motrin), Aleve (Naprosyn) or other non-steroidal anti-inflammatory medications.   ? Notify surgeon immediately if any ruby bleeding is noted in the urine, stool, emesis, or from the nose or the incision. Blood in the stool will often appear as black rather than red. Blood in urine may appear as pink. Blood in emesis may appear as brown/black like coffee grounds.  ? You will need to apply pressure for longer periods of time to any cuts or abrasions to stop bleeding  ? Avoid alcohol while taking anticoagulants    2. Stool Softeners: You will be at greater risk of constipation after surgery due to being less mobile and the pain medications.   ? Take stool softeners as instructed by your surgeon while on pain medications. Over the counter Colace 100 mg 1-2  capsules twice daily.   ? If stools become too loose or too frequent, please decreases the dosage or stop the stool softener.  ? If constipation occurs despite use of stool softeners, you are to continue the stool softeners and add a laxative (Milk of Magnesia 1 ounce daily as needed)  ? Drink plenty of fluids, and eat fruits and vegetables during your recovery time    3. Pain Medications utilized after surgery are narcotics and the law requires that the following information be given to all patients that are prescribed narcotics:  ? CLASSIFICATION: Pain medications are called Opioids and are narcotics  ? LEGALITIES: It is illegal to share narcotics with others and to drive within 24 hours of taking narcotics  ? POTENTIAL SIDE EFFECTS: Potential side effects of opioids include: nausea, vomiting, itching, dizziness, drowsiness, dry mouth, constipation, and difficulty urinating.  ? POTENTIAL ADVERSE EFFECTS:   o Opioid tolerance can develop with use of pain medications and this simply means that it requires more and more of the medication to control pain; however, this is seen more in patients that use opioids for longer periods of time.  o Opioid dependence can develop with use of Opioids and this simply means that to stop the medication can cause withdrawal symptoms; however, this is seen with patients that use Opioids for longer periods of time.  o Opioid addiction can develop with use of Opioids and the incidence of this is very unlikely in patients who take the medications as ordered and stop the medications as instructed.  o Opioid overdose can be dangerous, but is unlikely when the medication is taken as ordered and stopped when ordered. It is important not to mix opioids with alcohol or with and type of sedative such as Benadryl as this can lead to over sedation and respiratory difficulty.  ? DOSAGE:   o Pain medications will need to be taken consistently for the first week to decrease pain and promote  adequate pain relief and participation in physical therapy.  o After the initial surgical pain begins to resolve, you may begin to decrease the pain medication. By the end of 6-8 weeks, you should be off of pain medications.  o Refills will not be given by the office during evening hours, on weekends, or after 6-8 weeks post-op.  o To seek refills on pain medications during the initial 6 week post-operative period, you must call the office 48 hours in advance to request the refill. The office will then notify you when to  the prescription. DO NOT wait until you are out of the medication to request a refill.    V. FOLLOW-UP VISITS:  ? You will need to follow up in the office with your surgeon in 3 weeks. Please call this number [phone] to schedule this appointment.   Dr. Odilon London with Infectious Disease    If you have any concerns or suspected complications prior to your follow up visit, please call your surgeons office. Do not wait until your appointment time if you suspect complications. These will need to be addressed in the office promptly.      Discharge Medications:     1) Percocet 10/325 mg  1-2 po q 4-6 hours for pain control  2)  Aspirin 325 mg po twice daily for 2 weeks, then once daily for 4 weeks.    CC:Trevor De Anda MD:Wally Ventura MD

## 2018-01-20 NOTE — PLAN OF CARE
Problem: Patient Care Overview (Adult)  Goal: Plan of Care Review  Outcome: Ongoing (interventions implemented as appropriate)   01/20/18 3126   Coping/Psychosocial Response Interventions   Plan Of Care Reviewed With patient   Patient Care Overview   Progress improving   Outcome Evaluation   Outcome Summary/Follow up Plan VSS. TTWB to right leg with KI. up to chair with PT. IV ABX per ID. PICC CROW. IV Vanc every 12 hours. Vanc trough this shift with dose adjustment. voiding per urinal without difficulty. voices adequate pain control with PO Percocet. hx of REENA with no CPAP. o2 saturation stable on RA this shift- has not required o2 supplment. waiting final wound cultures which should be available tomorrow. possible discharge tomorrow with HH and IV ABX.        Problem: Fall Risk (Adult)  Goal: Absence of Falls  Outcome: Ongoing (interventions implemented as appropriate)      Problem: Knee Replacement, Total (Adult)  Goal: Signs and Symptoms of Listed Potential Problems Will be Absent or Manageable (Knee Replacement, Total)  Outcome: Ongoing (interventions implemented as appropriate)

## 2018-01-20 NOTE — PLAN OF CARE
Problem: Patient Care Overview (Adult)  Goal: Plan of Care Review  Outcome: Ongoing (interventions implemented as appropriate)   01/20/18 0229   Coping/Psychosocial Response Interventions   Plan Of Care Reviewed With patient   Patient Care Overview   Progress improving   Outcome Evaluation   Outcome Summary/Follow up Plan pain controlled during shift with PO pain meds; dressing c/d/i; FERNANDO dressing; drain in place; voiding; TTWB; knee immobilizer in place; PICC line; IV vanc continued; vss; educated pt about importance of O2 monitoring d/t hx of REENA- no cPAP; plan to d/c home with HH when ready     Goal: Adult Individualization and Mutuality  Outcome: Ongoing (interventions implemented as appropriate)      Problem: Perioperative Period (Adult)  Goal: Signs and Symptoms of Listed Potential Problems Will be Absent or Manageable (Perioperative Period)  Outcome: Ongoing (interventions implemented as appropriate)      Problem: Fall Risk (Adult)  Goal: Identify Related Risk Factors and Signs and Symptoms  Outcome: Outcome(s) achieved Date Met: 01/20/18    Goal: Absence of Falls  Outcome: Ongoing (interventions implemented as appropriate)      Problem: Knee Replacement, Total (Adult)  Goal: Signs and Symptoms of Listed Potential Problems Will be Absent or Manageable (Knee Replacement, Total)  Outcome: Ongoing (interventions implemented as appropriate)

## 2018-01-20 NOTE — PROGRESS NOTES
LOS: 2 days     Chief Complaint:  Follow-up R knee PJI    Interval History:  No acute events. 1x fever. Tolerating vanco w/o rash or diarrhea. Still awaiting final culture result. Should be available tomorrow. PICC is in.    ROS: no n/v/d    Vital Signs  Temp:  [97.9 °F (36.6 °C)-100.7 °F (38.2 °C)] 98.8 °F (37.1 °C)  Heart Rate:  [85-95] 85  Resp:  [16-18] 18  BP: (104-135)/(66-76) 104/69    Physical Exam:  General: awake, alert, NAD   Head: Normocephalic  Eyes:  no scleral icterus  ENT: MM dry, OP clear, no thrush. Poor  dentition.   Neck: Supple, no visible thyromegaly  Cardiovascular: NR, RR, no murmurs, rubs, or gallops; no LE edema  Respiratory:  normal work of breathing on ambient air  GI: Abdomen is soft, non-tender, non-distended  : no Ledesma catheter present  Musculoskeletal: R knee bandaged; no other joint abnormalities, normal musculature  Skin: No rashes, lesions, or embolic phenomenon  Neurological: Alert and oriented x 3,  motor strength 5/5 in upper extremities  Psychiatric: Normal mood and affect   Vasc: no cyanosis; PICC RUE w/o erythema    Meds:    Current Facility-Administered Medications:   •  acetaminophen (TYLENOL) tablet 1,000 mg, 1,000 mg, Oral, Q6H PRN, Wally Ventura MD  •  acetaminophen (TYLENOL) tablet 325 mg, 325 mg, Oral, Q4H PRN, Wally Ventura MD, 325 mg at 01/19/18 2001  •  aspirin EC tablet 325 mg, 325 mg, Oral, BID With Meals, Wally Ventura MD, 325 mg at 01/20/18 0801  •  bisacodyl (DULCOLAX) suppository 10 mg, 10 mg, Rectal, Daily PRN, Wally Ventura MD  •  diazePAM (VALIUM) tablet 5 mg, 5 mg, Oral, Q6H PRN, Wally Ventura MD  •  docusate sodium (COLACE) capsule 100 mg, 100 mg, Oral, BID, Wally Ventura MD  •  ferrous sulfate tablet 325 mg, 325 mg, Oral, Daily With Breakfast, Wally Ventura MD, 325 mg at 01/19/18 0858  •  ketorolac (TORADOL) injection 15 mg, 15 mg, Intravenous, Q8H PRN, Wally Ventura MD  •  magnesium hydroxide (MILK OF MAGNESIA) suspension  2400 mg/10mL 10 mL, 10 mL, Oral, Daily PRN, Wally Ventura MD  •  meclizine (ANTIVERT) tablet 25 mg, 25 mg, Oral, TID PRN, Wally Ventura MD  •  melatonin tablet 5 mg, 5 mg, Oral, Nightly PRN, Wally Ventura MD  •  morphine injection 6 mg, 6 mg, Intravenous, Q2H PRN **AND** naloxone (NARCAN) injection 0.4 mg, 0.4 mg, Intravenous, Q5 Min PRN, Wally Ventura MD  •  ondansetron (ZOFRAN) tablet 4 mg, 4 mg, Oral, Q6H PRN **OR** ondansetron ODT (ZOFRAN-ODT) disintegrating tablet 4 mg, 4 mg, Oral, Q6H PRN **OR** ondansetron (ZOFRAN) injection 4 mg, 4 mg, Intravenous, Q6H PRN, Wally Ventura MD, 4 mg at 01/18/18 2229  •  oxyCODONE-acetaminophen (PERCOCET)  MG per tablet 1 tablet, 1 tablet, Oral, Q4H PRN, Wally Ventura MD  •  oxyCODONE-acetaminophen (PERCOCET)  MG per tablet 2 tablet, 2 tablet, Oral, Q4H PRN, Wally Ventura MD, 2 tablet at 01/20/18 0801  •  Pharmacy to dose vancomycin, , Does not apply, Continuous PRN, Wally Ventura MD  •  sennosides-docusate sodium (SENOKOT-S) 8.6-50 MG tablet 2 tablet, 2 tablet, Oral, BID, Wally Ventura MD, 2 tablet at 01/20/18 0801  •  sodium chloride 0.45 % infusion, 100 mL/hr, Intravenous, Continuous, Wally Ventura MD, Last Rate: 100 mL/hr at 01/19/18 0034, 100 mL/hr at 01/19/18 0034  •  sodium chloride 0.9 % flush 1-10 mL, 1-10 mL, Intravenous, PRN, Wally Ventura MD  •  vancomycin 1750 mg/500 mL 0.9% NS IVPB ([facility]), 15 mg/kg, Intravenous, Q12H, Wally Ventura MD, 1,750 mg at 01/20/18 0021    LABS:  CBC, micro, CRP reviewed today  Lab Results   Component Value Date    WBC 7.75 01/20/2018    HGB 10.0 (L) 01/20/2018    HCT 31.0 (L) 01/20/2018    MCV 97.5 (H) 01/20/2018     01/20/2018     Lab Results   Component Value Date    GLUCOSE 123 (H) 01/19/2018    BUN 12 01/19/2018    CREATININE 0.83 01/20/2018    EGFRIFNONA 94 01/20/2018    EGFRIFAFRI  09/08/2016      Comment:      <15 Indicative of kidney failure.    BCR 14.1 01/19/2018    CO2 25.1  01/19/2018    CALCIUM 8.7 01/19/2018    ALBUMIN 4.10 01/15/2018    LABIL2 0.9 01/15/2018    AST 16 01/15/2018    ALT 15 01/15/2018    CRP 22.73 (H) 01/20/2018     Lab Results   Component Value Date    BRIANA 11.30 01/20/2018     Microbiology:  1/18 Right Leg Cx: 2 of 4 cultures with GPC     Radiology (prior):  XR knee with new spacer present and arthroplasty removed    Assessment/Plan   1. 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 ID will follow. I have discussed this case with his RN Sunita.

## 2018-01-21 VITALS
HEART RATE: 87 BPM | WEIGHT: 250.5 LBS | SYSTOLIC BLOOD PRESSURE: 114 MMHG | BODY MASS INDEX: 35.86 KG/M2 | TEMPERATURE: 98.3 F | RESPIRATION RATE: 16 BRPM | OXYGEN SATURATION: 97 % | HEIGHT: 70 IN | DIASTOLIC BLOOD PRESSURE: 65 MMHG

## 2018-01-21 LAB
BACTERIA SPEC AEROBE CULT: ABNORMAL
BACTERIA SPEC AEROBE CULT: NORMAL
BACTERIA SPEC AEROBE CULT: NORMAL
CREAT BLD-MCNC: 0.89 MG/DL (ref 0.76–1.27)
DEPRECATED RDW RBC AUTO: 45.2 FL (ref 37–54)
ERYTHROCYTE [DISTWIDTH] IN BLOOD BY AUTOMATED COUNT: 12.7 % (ref 11.5–14.5)
GFR SERPL CREATININE-BSD FRML MDRD: 87 ML/MIN/1.73
GRAM STN SPEC: ABNORMAL
GRAM STN SPEC: NORMAL
HCT VFR BLD AUTO: 30.9 % (ref 40.4–52.2)
HGB BLD-MCNC: 9.8 G/DL (ref 13.7–17.6)
MCH RBC QN AUTO: 31 PG (ref 27–32.7)
MCHC RBC AUTO-ENTMCNC: 31.7 G/DL (ref 32.6–36.4)
MCV RBC AUTO: 97.8 FL (ref 79.8–96.2)
PLATELET # BLD AUTO: 163 10*3/MM3 (ref 140–500)
PMV BLD AUTO: 10.7 FL (ref 6–12)
RBC # BLD AUTO: 3.16 10*6/MM3 (ref 4.6–6)
WBC NRBC COR # BLD: 8.38 10*3/MM3 (ref 4.5–10.7)

## 2018-01-21 PROCEDURE — 25010000002 VANCOMYCIN 10 G RECONSTITUTED SOLUTION: Performed by: ORTHOPAEDIC SURGERY

## 2018-01-21 PROCEDURE — 85027 COMPLETE CBC AUTOMATED: CPT | Performed by: INTERNAL MEDICINE

## 2018-01-21 PROCEDURE — 82565 ASSAY OF CREATININE: CPT | Performed by: INTERNAL MEDICINE

## 2018-01-21 PROCEDURE — 25010000003 CEFAZOLIN IN DEXTROSE 2-4 GM/100ML-% SOLUTION: Performed by: INTERNAL MEDICINE

## 2018-01-21 PROCEDURE — 99232 SBSQ HOSP IP/OBS MODERATE 35: CPT | Performed by: INTERNAL MEDICINE

## 2018-01-21 PROCEDURE — 97110 THERAPEUTIC EXERCISES: CPT | Performed by: PHYSICAL THERAPIST

## 2018-01-21 RX ORDER — CEFAZOLIN SODIUM 2 G/100ML
2 INJECTION, SOLUTION INTRAVENOUS EVERY 8 HOURS
Qty: 9000 ML | Refills: 1 | Status: SHIPPED | OUTPATIENT
Start: 2018-01-21 | End: 2018-03-02

## 2018-01-21 RX ORDER — CEFAZOLIN SODIUM 2 G/100ML
2 INJECTION, SOLUTION INTRAVENOUS EVERY 8 HOURS
Status: DISCONTINUED | OUTPATIENT
Start: 2018-01-21 | End: 2018-01-21 | Stop reason: HOSPADM

## 2018-01-21 RX ADMIN — OXYCODONE HYDROCHLORIDE AND ACETAMINOPHEN 2 TABLET: 10; 325 TABLET ORAL at 11:56

## 2018-01-21 RX ADMIN — OXYCODONE HYDROCHLORIDE AND ACETAMINOPHEN 2 TABLET: 10; 325 TABLET ORAL at 07:40

## 2018-01-21 RX ADMIN — CEFAZOLIN SODIUM 2 G: 2 INJECTION, SOLUTION INTRAVENOUS at 12:22

## 2018-01-21 RX ADMIN — DOCUSATE SODIUM 100 MG: 100 CAPSULE, LIQUID FILLED ORAL at 07:40

## 2018-01-21 RX ADMIN — VANCOMYCIN HYDROCHLORIDE 2000 MG: 10 INJECTION, POWDER, LYOPHILIZED, FOR SOLUTION INTRAVENOUS at 00:05

## 2018-01-21 RX ADMIN — ASPIRIN 325 MG: 325 TABLET, COATED ORAL at 07:40

## 2018-01-21 RX ADMIN — OXYCODONE HYDROCHLORIDE AND ACETAMINOPHEN 2 TABLET: 10; 325 TABLET ORAL at 02:20

## 2018-01-21 NOTE — THERAPY TREATMENT NOTE
Acute Care - Physical Therapy Treatment Note  [facility]     Patient Name: Rafael Bernstein  : 1955  MRN: [id number]  Today's Date: 2018  Onset of Illness/Injury or Date of Surgery Date: 18 (s/p Right Total Knee Arthroplasty Resection w Spacer)  Date of Referral to PT: 18  Referring Physician: Dr. Ventura    Admit Date: 2018    Visit Dx:    ICD-10-CM ICD-9-CM   1. Impaired functional mobility, balance, and endurance Z74.09 V49.89   2. Infection of right knee M00.9 711.96     Patient Active Problem List   Diagnosis   • OA (osteoarthritis) of knee               Adult Rehabilitation Note       18 1047 18 0945       Rehab Assessment/Intervention    Discipline physical therapist  -JAYLEN physical therapist  -JAYLEN     Document Type therapy note (daily note)  - therapy note (daily note)  -     Subjective Information agree to therapy;no complaints  -KH no complaints;agree to therapy  -     Patient Effort, Rehab Treatment good  -KH good  -     Symptoms Noted During/After Treatment fatigue  -KH fatigue;increased pain  -     Precautions/Limitations fall precautions   TTWB R LE  -KH fall precautions;non-weight bearing status;brace on when up   TTWBing of R LE  -KH     Recorded by [JAYLEN] Zoila Clarke, PT [KH] Zoila Clarke PT     Vital Signs    O2 Delivery Pre Treatment room air  -KH room air  -KH     Recorded by [JAYLEN] Zoila Clarke PT [KH] Zoila Clarke, ANNA     Pain Assessment    Pain Assessment No/denies pain   pain only if he places weight accidently through his R LE  -KH 0-10  -KH     Pain Score  4  -KH     Post Pain Score  5  -KH     Pain Type  Surgical pain  -     Pain Location  Knee  -     Pain Orientation  Right  -     Pain Intervention(s)  Repositioned;Ambulation/increased activity  -KH     Response to Interventions  tamiko  -KH     Recorded by [JAYLEN] Zoila Clarke PT [KH] Zoila Clarke PT     Vision Assessment/Intervention    Visual Impairment WFL  -KH WFL  -KH     Recorded by  [KH] Zoila Clarke, PT [KH] Zoila Clarke, PT     Cognitive Assessment/Intervention    Current Cognitive/Communication Assessment functional  -KH functional  -KH     Orientation Status oriented x 4  -KH oriented x 4  -KH     Follows Commands/Answers Questions 100% of the time  -% of the time  -KH     Personal Safety WNL/WFL  -KH WNL/WFL  -KH     Personal Safety Interventions fall prevention program maintained;gait belt;nonskid shoes/slippers when out of bed;supervised activity  -KH fall prevention program maintained;gait belt;nonskid shoes/slippers when out of bed;supervised activity  -KH     Short/Long Term Memory intact short term memory;intact long term memory  -KH intact short term memory;intact long term memory  -KH     Recorded by [KH] Zoila Clarke, PT [KH] Zoila Clarke, PT     Mobility Assessment/Training    Extremity Weight-Bearing Status  right lower extremity  -KH     Right Lower Extremity Weight-Bearing  toe touch weight-bearing  -KH     Recorded by  [KH] Zoila Clarke PT     Bed Mobility, Assessment/Treatment    Bed Mobility, Assistive Device  bed rails;head of bed elevated  -KH     Bed Mob, Supine to Sit, [place] minimum assist (75% patient effort)  -KH minimum assist (75% patient effort)  -KH     Bed Mob, Sit to Supine, [place] not tested  -KH not tested  -KH     Bed Mobility, Safety Issues decreased use of legs for bridging/pushing;impaired trunk control for bed mobility  -KH decreased use of legs for bridging/pushing;impaired trunk control for bed mobility  -KH     Bed Mobility, Impairments pain;strength decreased  -KH pain;strength decreased  -KH     Bed Mobility, Comment assistance with R LE  -KH assist with R LE when scooting to the EOB. Patient able to lift trunk independently, just needs assistance with his leg  -KH     Recorded by [KH] Zoila Clarke, PT [KH] Zoila Clarke, PT     Transfer Assessment/Treatment    Transfers, Sit-Stand [place] minimum assist (75% patient effort)  -KH  minimum assist (75% patient effort)  -KH     Transfers, Stand-Sit [place] minimum assist (75% patient effort)  -KH minimum assist (75% patient effort)  -KH     Transfers, Sit-Stand-Sit, Assist Device rolling walker  -KH rolling walker  -KH     Transfer, Maintain Weight Bearing Status able to maintain weight bearing status  -KH cues to maintain weight bearing status  -KH     Transfer, Safety Issues sequencing ability decreased;step length decreased;weight-shifting ability decreased  -KH sequencing ability decreased;step length decreased;weight-shifting ability decreased  -KH     Transfer, Impairments pain;strength decreased;impaired balance;ROM decreased  -KH pain;strength decreased;impaired balance;ROM decreased  -KH     Recorded by [KH] Zoila Clarke, PT [KH] Zoila Clarke, PT     Gait Assessment/Treatment    Gait, [place] Level contact guard assist;minimum assist (75% patient effort)  - contact guard assist;minimum assist (75% patient effort)  -     Gait, Assistive Device rolling walker  -KH rolling walker  -KH     Gait, Distance (Feet) 4  -KH 3   multiple small hops  -     Gait, Gait Pattern Analysis  2-point gait  -KH     Gait, Maintain Weight Bearing Status able to maintain weight bearing status  -KH able to maintain weight bearing status  -KH     Gait, Safety Issues balance decreased during turns;sequencing ability decreased  -KH balance decreased during turns;sequencing ability decreased  -     Gait, Impairments pain;strength decreased;impaired balance;ROM decreased  -KH pain;strength decreased;impaired balance;ROM decreased  -     Gait, Comment hopped from bed to chair  -KH Patient able to hop a few steps from bed to chair while maintaining NWB  -KH     Recorded by [KH] Zoila Clarke, PT [KH] Zoila Clarke, PT     Positioning and Restraints    Pre-Treatment Position in bed  - in bed  -KH     Post Treatment Position chair  - chair  -KH     In Chair reclined;call light within reach;encouraged  to call for assist;with family/caregiver  -KH reclined;call light within reach;encouraged to call for assist  -KH     Recorded by [KH] Zoila Clarke, PT [KH] Zoila Clarke, PT       User Key  (r) = Recorded By, (t) = Taken By, (c) = Cosigned By    Initials Name Effective Dates    JAYLEN Clarke, PT 06/22/16 -                 IP PT Goals       01/19/18 0937          Bed Mobility PT LTG    Bed Mobility PT LTG, Date Established 01/19/18  -MA      Bed Mobility PT LTG, Time to Achieve 1 wk  -MA      Bed Mobility PT LTG, Activity Type all bed mobility  -MA      Bed Mobility PT LTG, [place] Level supervision required  -MA      Transfer Training PT LTG    Transfer Training PT LTG, Date Established 01/19/18  -MA      Transfer Training PT LTG, Time to Achieve 1 wk  -MA      Transfer Training PT LTG, Activity Type all transfers  -MA      Transfer Training PT LTG, [place] Level supervision required  -MA      Transfer Training PT LTG, Assist Device walker, rolling   maintaining R TTWBing  -MA      Gait Training PT LTG    Gait Training Goal PT LTG, Date Established 01/19/18  -MA      Gait Training Goal PT LTG, Time to Achieve 1 wk  -MA      Gait Training Goal PT LTG, [place] Level supervision required  -MA      Gait Training Goal PT LTG, Assist Device walker, rolling  -MA      Gait Training Goal PT LTG, Distance to Achieve 25  -MA      Stair Training PT LTG    Stair Training Goal PT LTG, Date Established 01/19/18  -MA      Stair Training Goal PT LTG, Time to Achieve 1 wk  -MA      Stair Training Goal PT LTG, Number of Steps 4  -MA      Stair Training Goal PT LTG, [place] Level contact guard assist  -MA      Stair Training Goal PT LTG, Assist Device 1 handrail   maintaining TTWBing  -MA        User Key  (r) = Recorded By, (t) = Taken By, (c) = Cosigned By    Initials Name Provider Type    NASREEN Heredia PT Physical Therapist          Physical Therapy Education     Title: PT OT SLP Therapies (Done)      Topic: Physical Therapy (Done)     Point: Mobility training (Done)    Learning Progress Summary    Learner Readiness Method Response Comment Documented by Status   Patient Acceptance [facility] 01/21/18 1049 Done    Acceptance E [facility] 01/20/18 0949 Done    Acceptance [facility] 01/19/18 0942 Done               Point: Home exercise program (Done)    Learning Progress Summary    Learner Readiness Method Response Comment Documented by Status   Patient Acceptance E [facility] 01/19/18 0942 Done               Point: Body mechanics (Done)    Learning Progress Summary    Learner Readiness Method Response Comment Documented by Status   Patient Acceptance E [facility] 01/21/18 1049 Done    Acceptance E [facility] 01/20/18 0949 Done    Acceptance E [facility] 01/19/18 0942 Done               Point: Precautions (Done)    Learning Progress Summary    Learner Readiness Method Response Comment Documented by Status   Patient Acceptance E [facility] 01/21/18 1049 Done    Acceptance E [facility] 01/20/18 0949 Done    Acceptance E [facility] 01/19/18 0942 Done                      User Key     Initials Effective Dates Name Provider Type Discipline     06/22/16 -  Zoila Clarke, PT Physical Therapist PT    MA 12/13/16 -  Sarita Heredia PT Physical Therapist PT                    PT Recommendation and Plan  Anticipated Discharge Disposition: home with assist, home with home health, skilled nursing facility (d/o patient progress made)  Planned Therapy Interventions: balance training, bed mobility training, gait training, home exercise program, patient/family education, postural re-education, stair training, strengthening, transfer training  PT Frequency: daily  Plan of Care Review  Plan Of Care Reviewed With: patient  Progress: improving  Outcome Summary/Follow up Plan: Patient more confident with transfer today and able to maintain WB status          Outcome Measures       01/21/18 1000 01/20/18 0900 01/19/18 0900    How much help from another person do you  currently need...    Turning from your back to your side while in flat bed without using bedrails? 3  -KH 3  -KH 3  -MA    Moving from lying on back to sitting on the side of a flat bed without bedrails? 3  -KH 3  -KH 3  -MA    Moving to and from a bed to a chair (including a wheelchair)? 3  -KH 2  -KH 2  -MA    Standing up from a chair using your arms (e.g., wheelchair, bedside chair)? 3  -KH 2  -KH 2  -MA    Climbing 3-5 steps with a railing? 1  -KH 1  -KH 1  -MA    To walk in hospital room? 2  -KH 1  -KH 1  -MA    AM-PAC 6 Clicks Score 15  -KH 12  -KH 12  -MA    Functional Assessment    Outcome Measure Options AM-PAC 6 Clicks Basic Mobility (PT)  -KH AM-PAC 6 Clicks Basic Mobility (PT)  -KH AM-PAC 6 Clicks Basic Mobility (PT)  -MA      User Key  (r) = Recorded By, (t) = Taken By, (c) = Cosigned By    Initials Name Provider Type    JAYLEN Clarke, PT Physical Therapist    NASREEN Heredia PT Physical Therapist           Time Calculation:         PT Charges       01/21/18 1047          Time Calculation    Start Time 1025  -      Stop Time 1040  -      Time Calculation (min) 15 min  -      PT Received On 01/21/18  -JAYLEN      PT - Next Appointment 01/22/18  -JAYLEN        User Key  (r) = Recorded By, (t) = Taken By, (c) = Cosigned By    Initials Name Provider Type    JAYLEN Clarke PT Physical Therapist          Therapy Charges for Today     Code Description Service Date Service Provider Modifiers Qty    40697173109 HC PT THER PROC EA 15 MIN 1/20/2018 Zoila Clarke PT GP 1    30160433262 HC PT THER SUPP EA 15 MIN 1/20/2018 Zoila Clarke PT GP 1    12621921943 HC PT THER PROC EA 15 MIN 1/21/2018 Zoila Clarke PT GP 1    90428660428 HC PT THER SUPP EA 15 MIN 1/21/2018 Zoila Clarke, PT GP 1          PT G-Codes  Outcome Measure Options: AM-PAC 6 Clicks Basic Mobility (PT)    Zoila Clarke PT  1/21/2018

## 2018-01-21 NOTE — PROGRESS NOTES
"Ortho POD 3    Patient Name:  Rafael Bernstein  YOB: 1955  Medical Records Number:  [id number]    No complaints    /66 (BP Location: Left arm, Patient Position: Lying)  Pulse 83  Temp 98 °F (36.7 °C) (Oral)   Resp 17  Ht 177.8 cm (70\")  Wt 114 kg (250 lb 8 oz)  SpO2 96%  BMI 35.94 kg/m2    RLE:  NVI, calf nontender, sensation intact  No signs of DVT    Incision: clean, no infection    Lab Results (last 24 hours)     Procedure Component Value Units Date/Time    Vancomycin, Trough Vancomycin trough due 30 minutes prior to dose scheduled 1/20/18 at 1130. Please do not hang next dose until trough resulted. Hold next dose of vancomycin if trough >21 mcg/mL. [[id number]]  (Normal) Collected:  01/20/18 1058    Specimen:  Blood from Arm, Right Updated:  01/20/18 1140     Vancomycin Trough 11.30 mcg/mL     CBC (No Diff) [[id number]]  (Abnormal) Collected:  01/21/18 0351    Specimen:  Blood Updated:  01/21/18 0414     WBC 8.38 10*3/mm3      RBC 3.16 (L) 10*6/mm3      Hemoglobin 9.8 (L) g/dL      Hematocrit 30.9 (L) %      MCV 97.8 (H) fL      MCH 31.0 pg      MCHC 31.7 (L) g/dL      RDW 12.7 %      RDW-SD 45.2 fl      MPV 10.7 fL      Platelets 163 10*3/mm3     Creatinine, Serum [[id number]]  (Normal) Collected:  01/21/18 0351    Specimen:  Blood Updated:  01/21/18 0436     Creatinine 0.89 mg/dL      eGFR Non African Amer 87 mL/min/1.73     Wound Culture - Wound, Knee, Right [[id number]]  (Normal) Collected:  01/18/18 1545    Specimen:  Wound from Knee, Right Updated:  01/21/18 0712     Wound Culture No growth at 3 days     Gram Stain Result No organisms seen      Rare (1+) WBCs per low power field    Tissue / Bone Culture - Tissue, Knee, Right [[id number]]  (Normal) Collected:  01/18/18 1550    Specimen:  Tissue from Knee, Right Updated:  01/21/18 0714     Tissue Culture No growth at 3 days     Gram Stain Result No organisms seen      Rare (1+) WBCs per low power field    Wound Culture - Wound, Leg, " Right [[id number]]  (Abnormal)  (Susceptibility) Collected:  01/18/18 1556    Specimen:  Wound from Leg, Right Updated:  01/21/18 0806     Wound Culture --      Scant growth (1+) Staphylococcus lugdunensis (A)     Gram Stain Result No organisms seen      No WBCs per low power field    Susceptibility      Staphylococcus lugdunensis     MERISSA     Clindamycin <=0.25 ug/ml Susceptible     Erythromycin <=0.25 ug/ml Susceptible     Oxacillin 2 ug/ml Susceptible     Penicillin G >=0.5 ug/ml Resistant     Rifampin <=0.5 ug/ml Susceptible     Tetracycline <=1 ug/ml Susceptible     Trimethoprim + Sulfamethoxazole <=10 ug/ml Susceptible     Vancomycin <=0.5 ug/ml Susceptible                    Wound Culture - Wound, Leg, Right [[id number]]  (Abnormal)  (Susceptibility) Collected:  01/18/18 1555    Specimen:  Wound from Leg, Right Updated:  01/21/18 0808     Wound Culture --      Rare Staphylococcus lugdunensis (A)     Gram Stain Result Occasional Gram positive cocci      Few (2+) WBCs per low power field    Susceptibility      Staphylococcus lugdunensis     MERISSA     Clindamycin <=0.25 ug/ml Susceptible     Erythromycin <=0.25 ug/ml Susceptible     Oxacillin 2 ug/ml Susceptible     Penicillin G >=0.5 ug/ml Resistant     Rifampin <=0.5 ug/ml Susceptible     Tetracycline <=1 ug/ml Susceptible     Trimethoprim + Sulfamethoxazole <=10 ug/ml Susceptible     Vancomycin <=0.5 ug/ml Susceptible                          S/p Right TKA  TTWB with walker  ASA for DVT prophylaxis  Cx's grew Staph Lugdenensis, sensitive to Vanc  Home today on abx, if ok with ID (appreciate ID help)

## 2018-01-21 NOTE — NURSING NOTE
Spoke with Dr. Ventura regarding dressing changes at discharge. HH is to change the FERNANDO dressing POD #7 and reset battery. Patient to leave that dressing in place until office follow-up. Updated information called to patients wife (Amanda) who did not answer. Message left on voicemail. Will notify HH of orders.

## 2018-01-21 NOTE — PLAN OF CARE
Problem: Patient Care Overview (Adult)  Goal: Plan of Care Review  Outcome: Ongoing (interventions implemented as appropriate)   01/21/18 1346   Coping/Psychosocial Response Interventions   Plan Of Care Reviewed With patient;spouse   Patient Care Overview   Progress improving   Outcome Evaluation   Outcome Summary/Follow up Plan VSS. PT eval performed at bedside. steps completed. voices adequate pain control with PO Percocet. voiding per BRP/urinal. BM this shift. TTWB with KI. PICC CROW- IV ABX changed to Kefzol per ID. FERNANDO dressing CDI with no s/s of infection. hx of REENA with no CPAP. o2 saturation stable on RA this shift. discharge home with IV ABX and family support.        Problem: Fall Risk (Adult)  Goal: Absence of Falls  Outcome: Ongoing (interventions implemented as appropriate)      Problem: Knee Replacement, Total (Adult)  Goal: Signs and Symptoms of Listed Potential Problems Will be Absent or Manageable (Knee Replacement, Total)  Outcome: Ongoing (interventions implemented as appropriate)

## 2018-01-21 NOTE — PLAN OF CARE
Problem: Patient Care Overview (Adult)  Goal: Plan of Care Review  Outcome: Ongoing (interventions implemented as appropriate)   01/21/18 1049   Coping/Psychosocial Response Interventions   Plan Of Care Reviewed With patient   Patient Care Overview   Progress improving   Outcome Evaluation   Outcome Summary/Follow up Plan Patient more confident with transfer today and able to maintain WB status

## 2018-01-21 NOTE — PLAN OF CARE
Problem: Patient Care Overview (Adult)  Goal: Plan of Care Review  Outcome: Ongoing (interventions implemented as appropriate)   01/21/18 0413   Coping/Psychosocial Response Interventions   Plan Of Care Reviewed With patient   Patient Care Overview   Progress improving   Outcome Evaluation   Outcome Summary/Follow up Plan VSS, pt voices adequate pain control, knee immobilizer in place TTWB, PICC flushes well with good blood return, BM on night shift, voiding without difficulty, probable DC home with home health later today, educated on the importance of CPAP usage related to history of sleep apnea     Goal: Adult Individualization and Mutuality  Outcome: Ongoing (interventions implemented as appropriate)   01/18/18 1228 01/19/18 0230 01/20/18 0229   Individualization   Patient Specific Preferences Rafael --  --    Patient Specific Goals --  --  control pain and increase mobility   Patient Specific Interventions --  --  administer pain meds PRN and assist with ambulation   Mutuality/Individual Preferences   What Anxieties, Fears or Concerns Do You Have About Your Health or Care? --  NONE AT THIS TIME --    What Questions Do You Have About Your Health or Care? --  NONE AT THIS TIME --    What Information Would Help Us Give You More Personalized Care? --  NONE AT THIS TIME --      Goal: Discharge Needs Assessment  Outcome: Ongoing (interventions implemented as appropriate)   01/21/18 0413   Discharge Needs Assessment   Concerns To Be Addressed basic needs concerns   Readmission Within The Last 30 Days no previous admission in last 30 days   Equipment Needed After Discharge wound care supplies;walker, rolling  (knee immobilizer)   Discharge Facility/Level Of Care Needs home with home health   Discharge Disposition still a patient   Living Environment   Transportation Available car;family or friend will provide   Current Health   Anticipated Changes Related to Illness inability to care for self       Problem: Fall Risk  (Adult)  Goal: Absence of Falls  Outcome: Ongoing (interventions implemented as appropriate)   01/21/18 0413   Fall Risk (Adult)   Absence of Falls achieves outcome       Problem: Knee Replacement, Total (Adult)  Goal: Signs and Symptoms of Listed Potential Problems Will be Absent or Manageable (Knee Replacement, Total)  Outcome: Ongoing (interventions implemented as appropriate)   01/21/18 0413   Knee Replacement, Total   Problems Assessed (Total Knee Replacement) all   Problems Present (Total Knee Replacement) pain;functional decline/self care deficit;decreased range of motion;situational response

## 2018-01-21 NOTE — PROGRESS NOTES
LOS: 3 days     Chief Complaint:  Follow-up R knee PJI    Interval History:  No acute events. Eager for DC. Cultures finalized (see below). Working with PT currently.    ROS: no n/v/d    Vital Signs  Temp:  [97.2 °F (36.2 °C)-98.7 °F (37.1 °C)] 98.3 °F (36.8 °C)  Heart Rate:  [82-91] 87  Resp:  [16-18] 16  BP: (110-119)/(65-69) 114/65    Physical Exam:  General: awake, alert, NAD   Head: Normocephalic  Eyes:  no scleral icterus  ENT: MMM, OP clear, no thrush. Poor  dentition.   Neck: Supple, no visible thyromegaly  Cardiovascular: NR, RR, no murmurs, rubs, or gallops; no LE edema  Respiratory:  normal work of breathing on ambient air  GI: Abdomen is soft, non-tender, non-distended  : no Ledesma catheter present  Musculoskeletal: R knee bandaged; no other joint abnormalities, normal musculature  Skin: No rashes, lesions, or embolic phenomenon  Neurological: Alert and oriented x 3,  motor strength 5/5 in upper extremities  Psychiatric: Normal mood and affect   Vasc: no cyanosis; PICC RUE w/o erythema    Meds:    Current Facility-Administered Medications:   •  acetaminophen (TYLENOL) tablet 1,000 mg, 1,000 mg, Oral, Q6H PRN, Wally Ventura MD  •  acetaminophen (TYLENOL) tablet 325 mg, 325 mg, Oral, Q4H PRN, Wally Ventura MD, 325 mg at 01/19/18 2001  •  aspirin EC tablet 325 mg, 325 mg, Oral, BID With Meals, Wally Ventura MD, 325 mg at 01/21/18 0740  •  bisacodyl (DULCOLAX) suppository 10 mg, 10 mg, Rectal, Daily PRN, Wally Ventura MD  •  ceFAZolin in dextrose (ANCEF) IVPB solution 2 g, 2 g, Intravenous, Q8H, Odilon Hagen MD  •  diazePAM (VALIUM) tablet 5 mg, 5 mg, Oral, Q6H PRN, Wally Ventura MD  •  docusate sodium (COLACE) capsule 100 mg, 100 mg, Oral, BID, Wally Ventura MD, 100 mg at 01/21/18 0740  •  ferrous sulfate tablet 325 mg, 325 mg, Oral, Daily With Breakfast, Wally Ventura MD, 325 mg at 01/19/18 0858  •  ketorolac (TORADOL) injection 15 mg, 15 mg, Intravenous, Q8H PRN,  Wally Ventura MD  •  magnesium hydroxide (MILK OF MAGNESIA) suspension 2400 mg/10mL 10 mL, 10 mL, Oral, Daily PRN, Wally Ventura MD, 10 mL at 01/20/18 1720  •  meclizine (ANTIVERT) tablet 25 mg, 25 mg, Oral, TID PRN, Wally Ventura MD  •  melatonin tablet 5 mg, 5 mg, Oral, Nightly PRN, Wally Ventura MD  •  morphine injection 6 mg, 6 mg, Intravenous, Q2H PRN **AND** naloxone (NARCAN) injection 0.4 mg, 0.4 mg, Intravenous, Q5 Min PRN, Wally Ventura MD  •  ondansetron (ZOFRAN) tablet 4 mg, 4 mg, Oral, Q6H PRN **OR** ondansetron ODT (ZOFRAN-ODT) disintegrating tablet 4 mg, 4 mg, Oral, Q6H PRN **OR** ondansetron (ZOFRAN) injection 4 mg, 4 mg, Intravenous, Q6H PRN, Wally Ventura MD, 4 mg at 01/18/18 2229  •  oxyCODONE-acetaminophen (PERCOCET)  MG per tablet 1 tablet, 1 tablet, Oral, Q4H PRN, Wally Ventura MD  •  oxyCODONE-acetaminophen (PERCOCET)  MG per tablet 2 tablet, 2 tablet, Oral, Q4H PRN, Wally Ventura MD, 2 tablet at 01/21/18 0740  •  polyethylene glycol (MIRALAX) powder 17 g, 17 g, Oral, Daily PRN, Wally Ventura MD, 17 g at 01/20/18 1903  •  sennosides-docusate sodium (SENOKOT-S) 8.6-50 MG tablet 2 tablet, 2 tablet, Oral, BID, Wally Ventura MD, 2 tablet at 01/20/18 2059  •  sodium chloride 0.45 % infusion, 100 mL/hr, Intravenous, Continuous, Wally Ventura MD, Last Rate: 100 mL/hr at 01/19/18 0034, 100 mL/hr at 01/19/18 0034  •  sodium chloride 0.9 % flush 1-10 mL, 1-10 mL, Intravenous, PRN, Wally Ventura MD    LABS:  CBC, Crt, micro reviewed today  Lab Results   Component Value Date    WBC 8.38 01/21/2018    HGB 9.8 (L) 01/21/2018    HCT 30.9 (L) 01/21/2018    MCV 97.8 (H) 01/21/2018     01/21/2018     Lab Results   Component Value Date    GLUCOSE 123 (H) 01/19/2018    BUN 12 01/19/2018    CREATININE 0.89 01/21/2018    EGFRIFNONA 87 01/21/2018    EGFRIFAFRI  09/08/2016      Comment:      <15 Indicative of kidney failure.    BCR 14.1 01/19/2018    CO2 25.1  01/19/2018    CALCIUM 8.7 01/19/2018    ALBUMIN 4.10 01/15/2018    LABIL2 0.9 01/15/2018    AST 16 01/15/2018    ALT 15 01/15/2018    CRP 22.73 (H) 01/20/2018     Lab Results   Component Value Date    BRIANA 11.30 01/20/2018     Microbiology:  1/18 Right Leg Cx: 2 of 4 cultures with oxacillin-sensitive Staph lugdenensis     Radiology (prior):  XR knee with new spacer present and arthroplasty removed    Assessment/Plan   1. 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 Infectious diseases will sign off at this time with antibiotics plan in place but please call me at [phone] if any further questions.   I have discussed this case with his RN Sunita.

## 2018-01-23 LAB
BACTERIA SPEC ANAEROBE CULT: NORMAL

## 2018-01-25 ENCOUNTER — TELEPHONE (OUTPATIENT)
Dept: INFECTIOUS DISEASES | Facility: CLINIC | Age: 63
End: 2018-01-25

## 2018-01-25 NOTE — TELEPHONE ENCOUNTER
Patients wife, Amanda states that pt began cefazolin on 01/21; as of 01/23 pt began to experience hoarseness in his voice, barely able to speak.  Amanda states now that the patient is having SOA and what she considers to be panic attack's because the patient doesn't feel as if he is able to breath.

## 2018-01-25 NOTE — TELEPHONE ENCOUNTER
Please pass along the message that hoarseness would be a very unusual side effect of cefazolin. However, he should take a Benadryl 25 mg to see if that helps. He should remain on the cefazolin for now and come see me in clinic for an appt at 11:30 tomorrow (1/26/18). Thanks.

## 2018-01-26 ENCOUNTER — APPOINTMENT (OUTPATIENT)
Dept: LAB | Facility: HOSPITAL | Age: 63
End: 2018-01-26

## 2018-01-26 ENCOUNTER — HOSPITAL ENCOUNTER (OUTPATIENT)
Dept: GENERAL RADIOLOGY | Facility: HOSPITAL | Age: 63
Discharge: HOME | End: 2018-01-26
Attending: INTERNAL MEDICINE | Admitting: INTERNAL MEDICINE

## 2018-01-26 ENCOUNTER — OFFICE VISIT (OUTPATIENT)
Dept: INFECTIOUS DISEASES | Facility: CLINIC | Age: 63
End: 2018-01-26

## 2018-01-26 VITALS
HEIGHT: 60 IN | OXYGEN SATURATION: 95 % | SYSTOLIC BLOOD PRESSURE: 122 MMHG | HEART RATE: 92 BPM | TEMPERATURE: 97.8 F | DIASTOLIC BLOOD PRESSURE: 73 MMHG

## 2018-01-26 DIAGNOSIS — R06.02 SHORTNESS OF BREATH: Primary | ICD-10-CM

## 2018-01-26 DIAGNOSIS — T84.59XD INFECTION OF PROSTHETIC KNEE JOINT, SUBSEQUENT ENCOUNTER: ICD-10-CM

## 2018-01-26 DIAGNOSIS — Z79.2 LONG TERM (CURRENT) USE OF ANTIBIOTICS: ICD-10-CM

## 2018-01-26 DIAGNOSIS — Z96.659 INFECTION OF PROSTHETIC KNEE JOINT, SUBSEQUENT ENCOUNTER: ICD-10-CM

## 2018-01-26 LAB
B PERT DNA SPEC QL NAA+PROBE: NOT DETECTED
BASOPHILS # BLD AUTO: 0.03 10*3/MM3 (ref 0–0.2)
BASOPHILS NFR BLD AUTO: 0.4 % (ref 0–1.5)
C PNEUM DNA NPH QL NAA+NON-PROBE: NOT DETECTED
DEPRECATED RDW RBC AUTO: 47 FL (ref 37–54)
EOSINOPHIL # BLD AUTO: 0.84 10*3/MM3 (ref 0–0.7)
EOSINOPHIL NFR BLD AUTO: 10.6 % (ref 0.3–6.2)
ERYTHROCYTE [DISTWIDTH] IN BLOOD BY AUTOMATED COUNT: 13.5 % (ref 11.5–14.5)
FLUAV H1 2009 PAND RNA NPH QL NAA+PROBE: NOT DETECTED
FLUAV H1 HA GENE NPH QL NAA+PROBE: NOT DETECTED
FLUAV H3 RNA NPH QL NAA+PROBE: NOT DETECTED
FLUAV SUBTYP SPEC NAA+PROBE: NOT DETECTED
FLUBV RNA ISLT QL NAA+PROBE: NOT DETECTED
HADV DNA SPEC NAA+PROBE: NOT DETECTED
HCOV 229E RNA SPEC QL NAA+PROBE: NOT DETECTED
HCOV HKU1 RNA SPEC QL NAA+PROBE: NOT DETECTED
HCOV NL63 RNA SPEC QL NAA+PROBE: NOT DETECTED
HCOV OC43 RNA SPEC QL NAA+PROBE: NOT DETECTED
HCT VFR BLD AUTO: 38.9 % (ref 40.4–52.2)
HGB BLD-MCNC: 12.5 G/DL (ref 13.7–17.6)
HMPV RNA NPH QL NAA+NON-PROBE: NOT DETECTED
HPIV1 RNA SPEC QL NAA+PROBE: NOT DETECTED
HPIV2 RNA SPEC QL NAA+PROBE: NOT DETECTED
HPIV3 RNA NPH QL NAA+PROBE: NOT DETECTED
HPIV4 P GENE NPH QL NAA+PROBE: NOT DETECTED
IMM GRANULOCYTES # BLD: 0.13 10*3/MM3 (ref 0–0.03)
IMM GRANULOCYTES NFR BLD: 1.6 % (ref 0–0.5)
LYMPHOCYTES # BLD AUTO: 2.13 10*3/MM3 (ref 0.9–4.8)
LYMPHOCYTES NFR BLD AUTO: 26.9 % (ref 19.6–45.3)
M PNEUMO IGG SER IA-ACNC: NOT DETECTED
MCH RBC QN AUTO: 31 PG (ref 27–32.7)
MCHC RBC AUTO-ENTMCNC: 32.1 G/DL (ref 32.6–36.4)
MCV RBC AUTO: 96.5 FL (ref 79.8–96.2)
MONOCYTES # BLD AUTO: 0.58 10*3/MM3 (ref 0.2–1.2)
MONOCYTES NFR BLD AUTO: 7.3 % (ref 5–12)
NEUTROPHILS # BLD AUTO: 4.2 10*3/MM3 (ref 1.9–8.1)
NEUTROPHILS NFR BLD AUTO: 53.2 % (ref 42.7–76)
NRBC BLD MANUAL-RTO: 0 /100 WBC (ref 0–0)
PLATELET # BLD AUTO: 279 10*3/MM3 (ref 140–500)
PMV BLD AUTO: 10.2 FL (ref 6–12)
PROCALCITONIN SERPL-MCNC: 0.11 NG/ML (ref 0.1–0.25)
RBC # BLD AUTO: 4.03 10*6/MM3 (ref 4.6–6)
RHINOVIRUS RNA SPEC NAA+PROBE: NOT DETECTED
RSV RNA NPH QL NAA+NON-PROBE: NOT DETECTED
WBC NRBC COR # BLD: 7.91 10*3/MM3 (ref 4.5–10.7)

## 2018-01-26 PROCEDURE — 87798 DETECT AGENT NOS DNA AMP: CPT | Performed by: INTERNAL MEDICINE

## 2018-01-26 PROCEDURE — 87486 CHLMYD PNEUM DNA AMP PROBE: CPT | Performed by: INTERNAL MEDICINE

## 2018-01-26 PROCEDURE — 99214 OFFICE O/P EST MOD 30 MIN: CPT | Performed by: INTERNAL MEDICINE

## 2018-01-26 PROCEDURE — 87581 M.PNEUMON DNA AMP PROBE: CPT | Performed by: INTERNAL MEDICINE

## 2018-01-26 PROCEDURE — 85025 COMPLETE CBC W/AUTO DIFF WBC: CPT | Performed by: INTERNAL MEDICINE

## 2018-01-26 PROCEDURE — 84145 PROCALCITONIN (PCT): CPT | Performed by: INTERNAL MEDICINE

## 2018-01-26 PROCEDURE — 36415 COLL VENOUS BLD VENIPUNCTURE: CPT | Performed by: INTERNAL MEDICINE

## 2018-01-26 PROCEDURE — 71046 X-RAY EXAM CHEST 2 VIEWS: CPT

## 2018-01-26 PROCEDURE — 87633 RESP VIRUS 12-25 TARGETS: CPT | Performed by: INTERNAL MEDICINE

## 2018-01-26 NOTE — PROGRESS NOTES
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 male here for f/u right knee arthroplasty infection secondary to oxacillin-sensitive Staph lugdenensis. He left the hospital on 1/21/18 with plans for 6 weeks of cefazolin therapy. He contacted our office yesterday due to hoarseness of voice and some anxiety and was concerned that the cefazolin might be the cause so I asked him to come in for evaluation.    He says the hoarseness began about 3 days ago. He says he became panicked afterwards and had worsening SOA. He reports some subjective warmth but Tmax 99.0 F at home. No shaking chills. He denies a cough. He has no rash on the back or the chest. He has eczema on his foot that is stable. No diarrhea on the antibiotics. No leg swelling other than what is to be expected in the RLE after such a big knee operation. His incision is healing well. No issues w/ PICC line.     Review of Systems: no n/v/d    PMH:  OA  GERD  Tobacco abuse  Hemochromatosis  REENA  BPPV  Nasal septum surgery  Rotator cuff repair  R TKA in 2012  R TKA removal and spacer placement 1/18/18 secondary to staph lugdenensis infection     Social History:    Worked making [employer]  Now disabled  Quit smoking 9/2017     Family History:  No 1st degree relatives w/ bone or joint infections     Allergies:  No Antibiotic Allergies    Medications:   Current Outpatient Prescriptions:   •  acetaminophen (TYLENOL) 500 MG tablet, Take 1,000 mg by mouth Every 6 (Six) Hours As Needed for Mild Pain ., Disp: , Rfl:   •  aspirin  MG EC tablet, Take 1 tablet by mouth 2 (Two) Times a Day With Meals., Disp: 60 tablet, Rfl: 0  •  ceFAZolin in dextrose (ANCEF) 2-4 GM/100ML-% solution IVPB, Infuse 100 mL into a venous catheter Every 8 (Eight) Hours for 120 doses. Indications: Bone and/or Joint Infection, Disp: 9000 mL, Rfl: 1  •  HYDROcodone-acetaminophen (NORCO) 5-325 MG per  "tablet, Take 1 tablet by mouth Every 6 (Six) Hours As Needed., Disp: , Rfl:   •  meclizine (ANTIVERT) 25 MG tablet, Take 25 mg by mouth 3 (Three) Times a Day As Needed for dizziness., Disp: , Rfl:   •  oxyCODONE-acetaminophen (PERCOCET)  MG per tablet, Take 1-2 tablets by mouth Every 4 (Four) Hours As Needed for Moderate Pain  (Pain)., Disp: 80 tablet, Rfl: 0      OBJECTIVE:  /73  Pulse 92  Temp 97.8 °F (36.6 °C)  Ht 70.5 cm (27.76\")  SpO2 95%  General: awake, alert, NAD   Head: Normocephalic  Eyes:  no scleral icterus  ENT: MMM, OP clear, no thrush. Poor  dentition.   Neck: Supple, no visible thyromegaly  Cardiovascular: NR, RR, no murmurs, rubs, or gallops; no LE edema  Respiratory:  normal work of breathing on ambient air  GI: Abdomen is soft, non-tender, non-distended  : no Ledesma catheter present  Musculoskeletal: R knee bandaged; no other joint abnormalities, normal musculature  Skin: No rashes, lesions, or embolic phenomenon  Neurological: Alert and oriented x 3,  motor strength 5/5 in upper extremities  Psychiatric: Normal mood and affect   Vasc: no cyanosis; PICC RUE w/o erythema      DIAGNOSTICS:  Lab Results   Component Value Date    WBC 8.38 01/21/2018    HGB 9.8 (L) 01/21/2018    HCT 30.9 (L) 01/21/2018     01/21/2018     Lab Results   Component Value Date    CRP 22.73 (H) 01/20/2018     Lab Results   Component Value Date    SEDRATE 68 (H) 01/15/2018     Lab Results   Component Value Date    GLUCOSE 123 (H) 01/19/2018    BUN 12 01/19/2018    CREATININE 0.89 01/21/2018    EGFRIFNONA 87 01/21/2018    EGFRIFAFRI  09/08/2016      Comment:      <15 Indicative of kidney failure.    BCR 14.1 01/19/2018    CO2 25.1 01/19/2018    CALCIUM 8.7 01/19/2018    ALBUMIN 4.10 01/15/2018    LABIL2 0.9 01/15/2018    AST 16 01/15/2018    ALT 15 01/15/2018     Microbiology:  1/18 Right Leg Cx: 2 of 4 cultures with oxacillin-sensitive Staph lugdenensis    ASSESSMENT/PLAN:  1. Right knee arthroplasty " infection secondary to oxacillin-sensitive Staph lugdenensis  -s/p removal of hardware and antibiotic spacer placement on 1/18/18  -continue cefazolin 2 g IV q8h x 6 weeks with stop date 3/2/18  -f/u with me ID clinic on 3/2/18  -continue weekly labs    2. Long term use of antibiotics  -continue weekly labs    3.Shortness of breath - new problem  -check CXR, RVP, CBC, and procalcitonin  -he is not hypoxic or tachycardic so I doubt PE  -this does not appear to be a drug reaction so he should continue cefazolin as per #1  -will review diff on CBC above to make sure no eosinophils    RTC as scheduled.

## 2018-01-30 ENCOUNTER — TELEPHONE (OUTPATIENT)
Dept: INFECTIOUS DISEASES | Facility: CLINIC | Age: 63
End: 2018-01-30

## 2018-02-01 NOTE — TELEPHONE ENCOUNTER
Pt called back in and stated that he is feeling better.  He is still experiencing a minor amt of SOA but has not needed any benadryl.

## 2018-03-02 ENCOUNTER — OFFICE VISIT (OUTPATIENT)
Dept: INFECTIOUS DISEASES | Facility: CLINIC | Age: 63
End: 2018-03-02

## 2018-03-02 VITALS
HEART RATE: 91 BPM | DIASTOLIC BLOOD PRESSURE: 75 MMHG | RESPIRATION RATE: 12 BRPM | HEIGHT: 60 IN | SYSTOLIC BLOOD PRESSURE: 117 MMHG | TEMPERATURE: 98.4 F

## 2018-03-02 DIAGNOSIS — R06.02 SHORTNESS OF BREATH: ICD-10-CM

## 2018-03-02 DIAGNOSIS — Z79.2 LONG TERM (CURRENT) USE OF ANTIBIOTICS: ICD-10-CM

## 2018-03-02 DIAGNOSIS — B95.8 STAPH INFECTION: ICD-10-CM

## 2018-03-02 DIAGNOSIS — T84.53XD INFECTION ASSOCIATED WITH INTERNAL RIGHT KNEE PROSTHESIS, SUBSEQUENT ENCOUNTER: Primary | ICD-10-CM

## 2018-03-02 PROCEDURE — 99214 OFFICE O/P EST MOD 30 MIN: CPT | Performed by: INTERNAL MEDICINE

## 2018-03-02 NOTE — PROGRESS NOTES
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 male here for f/u right knee arthroplasty infection secondary to oxacillin-sensitive Staph lugdenensis s/p removal of hardware and antibiotic spacer placement on 1/18/18. He has now completed 6 weeks of IV cefazolin.  He reports only mild very intermittent dull knee pain worse w/ movement and relieved w/ rest and rarely needs opioids. He has no fevers,c hills or sweats. No side effects of antibiotics such as rash or diarrhea. He sees Dr Ventura later this afternoon    Review of Systems: no n/v/d    PMH:  OA  GERD  Tobacco abuse  Hemochromatosis  REENA  BPPV  Nasal septum surgery  Rotator cuff repair  R TKA in 2012  R TKA removal and spacer placement 1/18/18 secondary to Staph lugdenensis infection     Social History:    Worked making [employer]  Now disabled  Quit smoking 9/2017     Family History:  No 1st degree relatives w/ bone or joint infections     Allergies:  No Antibiotic Allergies    Medications:   Current Outpatient Prescriptions:   •  acetaminophen (TYLENOL) 500 MG tablet, Take 1,000 mg by mouth Every 6 (Six) Hours As Needed for Mild Pain ., Disp: , Rfl:   •  aspirin  MG EC tablet, Take 1 tablet by mouth 2 (Two) Times a Day With Meals., Disp: 60 tablet, Rfl: 0  •  ceFAZolin in dextrose (ANCEF) 2-4 GM/100ML-% solution IVPB, Infuse 100 mL into a venous catheter Every 8 (Eight) Hours for 120 doses. Indications: Bone and/or Joint Infection, Disp: 9000 mL, Rfl: 1  •  HYDROcodone-acetaminophen (NORCO) 5-325 MG per tablet, Take 1 tablet by mouth Every 6 (Six) Hours As Needed., Disp: , Rfl:   •  meclizine (ANTIVERT) 25 MG tablet, Take 25 mg by mouth 3 (Three) Times a Day As Needed for dizziness., Disp: , Rfl:   •  oxyCODONE-acetaminophen (PERCOCET)  MG per tablet, Take 1-2 tablets by mouth Every 4 (Four) Hours As Needed for Moderate Pain  (Pain)., Disp: 80 tablet, Rfl:  "0      OBJECTIVE:  /75  Pulse 91  Temp 98.4 °F (36.9 °C)  Resp 12  Ht 70.5 cm (27.76\")  General: awake, alert, NAD   Head: Normocephalic  Eyes:  no scleral icterus  ENT: MMM, OP clear, no thrush. Poor  dentition.   Neck: Supple  Cardiovascular: NR, no LE edema  Respiratory:  normal work of breathing on ambient air  GI: Abdomen is non-distended  Musculoskeletal: R knee with mild effusion, incision is healing, mild warmth; no erythema; bruise on medial side  Skin: No rashes, lesions, or embolic phenomenon  Psychiatric: Normal mood and affect   Vasc: no cyanosis; PICC RUE w/o erythema    DIAGNOSTICS:   Labs 2/26/18:  WBC 6.4  Crt 0.8  CRP 0.8    Microbiology:  1/18 Right Leg Cx: 2 of 4 cultures with oxacillin-sensitive Staph lugdenensis    ASSESSMENT/PLAN:  1. 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 Long term use of antibiotics  -DC antibiotic, PICC, and weekly labs    3.Shortness of breath - resolved      RTC as needed  "

## 2018-03-14 ENCOUNTER — APPOINTMENT (OUTPATIENT)
Dept: PREADMISSION TESTING | Facility: HOSPITAL | Age: 63
End: 2018-03-14

## 2018-03-14 ENCOUNTER — HOSPITAL ENCOUNTER (OUTPATIENT)
Dept: GENERAL RADIOLOGY | Facility: HOSPITAL | Age: 63
Discharge: HOME | End: 2018-03-14

## 2018-03-14 ENCOUNTER — HOSPITAL ENCOUNTER (OUTPATIENT)
Dept: GENERAL RADIOLOGY | Facility: HOSPITAL | Age: 63
Discharge: HOME | End: 2018-03-14
Admitting: ORTHOPAEDIC SURGERY

## 2018-03-14 VITALS
RESPIRATION RATE: 16 BRPM | BODY MASS INDEX: 32.9 KG/M2 | SYSTOLIC BLOOD PRESSURE: 119 MMHG | WEIGHT: 235 LBS | OXYGEN SATURATION: 97 % | DIASTOLIC BLOOD PRESSURE: 77 MMHG | TEMPERATURE: 98 F | HEIGHT: 71 IN | HEART RATE: 90 BPM

## 2018-03-14 LAB
ABO GROUP BLD: NORMAL
BASOPHILS # BLD AUTO: 0.01 10*3/MM3 (ref 0–0.2)
BASOPHILS NFR BLD AUTO: 0.2 % (ref 0–1.5)
BILIRUB UR QL STRIP: NEGATIVE
BLD GP AB SCN SERPL QL: NEGATIVE
CLARITY UR: CLEAR
COLOR UR: ABNORMAL
CRP SERPL-MCNC: 0.77 MG/DL (ref 0–0.5)
DEPRECATED RDW RBC AUTO: 44.6 FL (ref 37–54)
EOSINOPHIL # BLD AUTO: 0.21 10*3/MM3 (ref 0–0.7)
EOSINOPHIL NFR BLD AUTO: 3.5 % (ref 0.3–6.2)
ERYTHROCYTE [DISTWIDTH] IN BLOOD BY AUTOMATED COUNT: 12.7 % (ref 11.5–14.5)
ERYTHROCYTE [SEDIMENTATION RATE] IN BLOOD: 10 MM/HR (ref 0–20)
GLUCOSE UR STRIP-MCNC: NEGATIVE MG/DL
HCT VFR BLD AUTO: 44.9 % (ref 40.4–52.2)
HGB BLD-MCNC: 14.7 G/DL (ref 13.7–17.6)
HGB UR QL STRIP.AUTO: NEGATIVE
INR PPP: 0.97 (ref 0.9–1.1)
KETONES UR QL STRIP: ABNORMAL
LEUKOCYTE ESTERASE UR QL STRIP.AUTO: NEGATIVE
LYMPHOCYTES # BLD AUTO: 2.06 10*3/MM3 (ref 0.9–4.8)
LYMPHOCYTES NFR BLD AUTO: 33.9 % (ref 19.6–45.3)
MCH RBC QN AUTO: 31.7 PG (ref 27–32.7)
MCHC RBC AUTO-ENTMCNC: 32.7 G/DL (ref 32.6–36.4)
MCV RBC AUTO: 97 FL (ref 79.8–96.2)
MONOCYTES # BLD AUTO: 0.56 10*3/MM3 (ref 0.2–1.2)
MONOCYTES NFR BLD AUTO: 9.2 % (ref 5–12)
NEUTROPHILS # BLD AUTO: 3.21 10*3/MM3 (ref 1.9–8.1)
NEUTROPHILS NFR BLD AUTO: 52.9 % (ref 42.7–76)
NITRITE UR QL STRIP: NEGATIVE
PH UR STRIP.AUTO: <=5 [PH] (ref 5–8)
PLATELET # BLD AUTO: 178 10*3/MM3 (ref 140–500)
PMV BLD AUTO: 11.6 FL (ref 6–12)
PROT UR QL STRIP: NEGATIVE
PROTHROMBIN TIME: 12.7 SECONDS (ref 11.7–14.2)
RBC # BLD AUTO: 4.63 10*6/MM3 (ref 4.6–6)
RH BLD: NEGATIVE
SP GR UR STRIP: 1.02 (ref 1–1.03)
UROBILINOGEN UR QL STRIP: ABNORMAL
WBC NRBC COR # BLD: 6.07 10*3/MM3 (ref 4.5–10.7)

## 2018-03-14 PROCEDURE — 85610 PROTHROMBIN TIME: CPT | Performed by: ORTHOPAEDIC SURGERY

## 2018-03-14 PROCEDURE — 93005 ELECTROCARDIOGRAM TRACING: CPT

## 2018-03-14 PROCEDURE — 85652 RBC SED RATE AUTOMATED: CPT | Performed by: ORTHOPAEDIC SURGERY

## 2018-03-14 PROCEDURE — 36415 COLL VENOUS BLD VENIPUNCTURE: CPT

## 2018-03-14 PROCEDURE — 93010 ELECTROCARDIOGRAM REPORT: CPT | Performed by: INTERNAL MEDICINE

## 2018-03-14 PROCEDURE — 86900 BLOOD TYPING SEROLOGIC ABO: CPT | Performed by: ORTHOPAEDIC SURGERY

## 2018-03-14 PROCEDURE — 71046 X-RAY EXAM CHEST 2 VIEWS: CPT

## 2018-03-14 PROCEDURE — 86140 C-REACTIVE PROTEIN: CPT | Performed by: ORTHOPAEDIC SURGERY

## 2018-03-14 PROCEDURE — 86850 RBC ANTIBODY SCREEN: CPT | Performed by: ORTHOPAEDIC SURGERY

## 2018-03-14 PROCEDURE — 73560 X-RAY EXAM OF KNEE 1 OR 2: CPT

## 2018-03-14 PROCEDURE — 81003 URINALYSIS AUTO W/O SCOPE: CPT | Performed by: ORTHOPAEDIC SURGERY

## 2018-03-14 PROCEDURE — 86901 BLOOD TYPING SEROLOGIC RH(D): CPT | Performed by: ORTHOPAEDIC SURGERY

## 2018-03-14 PROCEDURE — 85027 COMPLETE CBC AUTOMATED: CPT | Performed by: ORTHOPAEDIC SURGERY

## 2018-03-14 ASSESSMENT — KOOS JR
KOOS JR SCORE: 25
KOOS JR SCORE: 20.941

## 2018-03-14 NOTE — DISCHARGE INSTRUCTIONS
PLEASE ARRIVE AT 8:30 AM ON 3/27/2018      Take the following medications the morning of surgery with a small sip of water:  PERCOCET IF NEEDED FOR PAIN        General Instructions:  • Do not eat solid food after midnight the night before surgery.  • You may drink clear liquids day of surgery but must stop at least one hour before your hospital arrival time. **STOP FLUIDS AT 7:30 AM ON DAY OF SURGERY**  • It is beneficial for you to have a clear drink that contains carbohydrates the day of surgery.  We suggest a 12 to 20 ounce bottle of Gatorade or Powerade for non-diabetic patients or a 12 to 20 ounce bottle of G2 or Powerade Zero for diabetic patients. (Pediatric patients, are not advised to drink a 12 to 20 ounce carbohydrate drink)    Clear liquids are liquids you can see through.  Nothing red in color.     Plain water                               Sports drinks  Sodas                                   Gelatin (Jell-O)  Fruit juices without pulp such as white grape juice and apple juice  Popsicles that contain no fruit or yogurt  Tea or coffee (no cream or milk added)  Gatorade / Powerade  G2 / Powerade Zero    • Infants may have breast milk up to four hours before surgery.  • Infants drinking formula may drink formula up to six hours before surgery.   • Patients who avoid smoking, chewing tobacco and alcohol for 4 weeks prior to surgery have a reduced risk of post-operative complications.  Quit smoking as many days before surgery as you can.  • Do not smoke, use chewing tobacco or drink alcohol the day of surgery.   • If applicable bring your C-PAP/ BI-PAP machine.  • Bring any papers given to you in the doctor’s office.  • Wear clean comfortable clothes and socks.  • Do not wear contact lenses or make-up.  Bring a case for your glasses.   • Bring crutches or walker if applicable.  • Remove all piercings.  Leave jewelry and any other valuables at home.  • Hair extensions with metal clips must be removed prior  to surgery.  • The Pre-Admission Testing nurse will instruct you to bring medications if unable to obtain an accurate list in Pre-Admission Testing.        If you were given a blood bank ID arm band remember to bring it with you the day of surgery.    Preventing a Surgical Site Infection:  • For 2 to 3 days before surgery, avoid shaving with a razor because the razor can irritate skin and make it easier to develop an infection.  • The night prior to surgery sleep in a clean bed with clean clothing.  Do not allow pets to sleep with you.  • Shower on the morning of surgery using a fresh bar of anti-bacterial soap (such as Dial) and clean washcloth.  Dry with a clean towel and dress in clean clothing.  • Ask your surgeon if you will be receiving antibiotics prior to surgery.  • Make sure you, your family, and all healthcare providers clean their hands with soap and water or an alcohol based hand  before caring for you or your wound.    Day of surgery:  Upon arrival, a Pre-op nurse and Anesthesiologist will review your health history, obtain vital signs, and answer questions you may have.  The only belongings needed at this time will be your home medications and if applicable your C-PAP/BI-PAP machine.  If you are staying overnight your family can leave the rest of your belongings in the car and bring them to your room later.  A Pre-op nurse will start an IV and you may receive medication in preparation for surgery, including something to help you relax.  Your family will be able to see you in the Pre-op area.  While you are in surgery your family should notify the waiting room  if they leave the waiting room area and provide a contact phone number.    Please be aware that surgery does come with discomfort.  We want to make every effort to control your discomfort so please discuss any uncontrolled symptoms with your nurse.   Your doctor will most likely have prescribed pain medications.      If you  are going home after surgery you will receive individualized written care instructions before being discharged.  A responsible adult must drive you to and from the hospital on the day of your surgery and stay with you for 24 hours.    If you are staying overnight following surgery, you will be transported to your hospital room following the recovery period.  [facility] has all private rooms.    If you have any questions please call Pre-Admission Testing at [phone].  Deductibles and co-payments are collected on the day of service. Please be prepared to pay the required co-pay, deductible or deposit on the day of service as defined by your plan.    2% CHLORAHEXIDINE GLUCONATE* CLOTH  Preparing or “prepping” skin before surgery can reduce the risk of infection at the surgical site. To make the process easier, [facility] has chosen disposable cloths moistened with a rinse-free, 2% Chlorhexidine Gluconate (CHG) antiseptic solution. The steps below outline the prepping process and should be carefully followed.        Use the prep cloth on the area that is circled in the diagram             Directions Night before Surgery  1) Shower using a fresh bar of anti-bacterial soap (such as Dial) and clean washcloth.  Use a clean towel to completely dry your skin.  2) Do not use any lotions, oils or creams on your skin.  3) Open the package and remove 1 cloth, wipe your skin for 30 seconds in a circular motion.  Allow to dry for 3 minutes.  4) Repeat #3 with second cloth.  5) Do not touch your eyes, ears, or mouth with the prep cloth.  6) Allow the wet prep solution to air dry.  7) Discard the prep cloth and wash your hands with soap and water.   8) Dress in clean bed clothes and sleep on fresh clean bed sheets.   9) You may experience some temporary itching after the prep.    Directions Day of Surgery  1) Repeat steps 1,2,3,4,5,6,7, and 9.   2) Dress in clean clothes before coming to the  [facility].    BACTROBAN NASAL OINTMENT  There are many germs normally in your nose. Bactroban is an ointment that will help reduce these germs. Please follow these instructions for Bactroban use:      __1ST__The day before surgery in the morning  Date___3/26_____    __2ND__The day before surgery in the evening              Date____3/26____    _3RD___The day of surgery in the morning    Date__3/27______    **Squirt ½ package of Bactroban Ointment onto a cotton applicator and apply to inside of 1st nostril.  Squirt the remaining Bactroban and apply to the inside of the other nostril.

## 2018-03-27 ENCOUNTER — APPOINTMENT (OUTPATIENT)
Dept: GENERAL RADIOLOGY | Facility: HOSPITAL | Age: 63
End: 2018-03-27

## 2018-03-27 ENCOUNTER — HOSPITAL ENCOUNTER (INPATIENT)
Facility: HOSPITAL | Age: 63
LOS: 2 days | Discharge: HOME HEALTH | End: 2018-03-29
Attending: ORTHOPAEDIC SURGERY | Admitting: ORTHOPAEDIC SURGERY

## 2018-03-27 ENCOUNTER — ANESTHESIA (OUTPATIENT)
Dept: PERIOP | Facility: HOSPITAL | Age: 63
End: 2018-03-27

## 2018-03-27 ENCOUNTER — ANESTHESIA EVENT (OUTPATIENT)
Dept: PERIOP | Facility: HOSPITAL | Age: 63
End: 2018-03-27

## 2018-03-27 DIAGNOSIS — M00.9 SEPTIC JOINT OF RIGHT KNEE JOINT (HCC): ICD-10-CM

## 2018-03-27 DIAGNOSIS — R26.2 DIFFICULTY WALKING: Primary | ICD-10-CM

## 2018-03-27 LAB
ALBUMIN SERPL-MCNC: 4.3 G/DL (ref 3.5–5.2)
ALBUMIN/GLOB SERPL: 1.3 G/DL
ALP SERPL-CCNC: 83 U/L (ref 39–117)
ALT SERPL W P-5'-P-CCNC: 18 U/L (ref 1–41)
ANION GAP SERPL CALCULATED.3IONS-SCNC: 14.4 MMOL/L
AST SERPL-CCNC: 22 U/L (ref 1–40)
BILIRUB SERPL-MCNC: 0.6 MG/DL (ref 0.1–1.2)
BUN BLD-MCNC: 14 MG/DL (ref 8–23)
BUN/CREAT SERPL: 17.3 (ref 7–25)
CALCIUM SPEC-SCNC: 9.5 MG/DL (ref 8.6–10.5)
CHLORIDE SERPL-SCNC: 104 MMOL/L (ref 98–107)
CO2 SERPL-SCNC: 23.6 MMOL/L (ref 22–29)
CREAT BLD-MCNC: 0.81 MG/DL (ref 0.76–1.27)
CRP SERPL-MCNC: 0.3 MG/DL (ref 0–0.5)
ERYTHROCYTE [SEDIMENTATION RATE] IN BLOOD: 15 MM/HR (ref 0–20)
GFR SERPL CREATININE-BSD FRML MDRD: 96 ML/MIN/1.73
GLOBULIN UR ELPH-MCNC: 3.2 GM/DL
GLUCOSE BLD-MCNC: 105 MG/DL (ref 65–99)
POTASSIUM BLD-SCNC: 4 MMOL/L (ref 3.5–5.2)
PROT SERPL-MCNC: 7.5 G/DL (ref 6–8.5)
SODIUM BLD-SCNC: 142 MMOL/L (ref 136–145)

## 2018-03-27 PROCEDURE — 97110 THERAPEUTIC EXERCISES: CPT

## 2018-03-27 PROCEDURE — 25010000002 NEOSTIGMINE 10 MG/10ML SOLUTION: Performed by: NURSE ANESTHETIST, CERTIFIED REGISTERED

## 2018-03-27 PROCEDURE — C1776 JOINT DEVICE (IMPLANTABLE): HCPCS | Performed by: ORTHOPAEDIC SURGERY

## 2018-03-27 PROCEDURE — 87075 CULTR BACTERIA EXCEPT BLOOD: CPT | Performed by: ORTHOPAEDIC SURGERY

## 2018-03-27 PROCEDURE — 25010000002 DEXAMETHASONE PER 1 MG: Performed by: NURSE ANESTHETIST, CERTIFIED REGISTERED

## 2018-03-27 PROCEDURE — 80053 COMPREHEN METABOLIC PANEL: CPT | Performed by: ORTHOPAEDIC SURGERY

## 2018-03-27 PROCEDURE — 25010000002 MORPHINE (PF) 10 MG/ML SOLUTION 1 ML VIAL: Performed by: ORTHOPAEDIC SURGERY

## 2018-03-27 PROCEDURE — 25010000002 ONDANSETRON PER 1 MG: Performed by: NURSE ANESTHETIST, CERTIFIED REGISTERED

## 2018-03-27 PROCEDURE — 86140 C-REACTIVE PROTEIN: CPT | Performed by: ORTHOPAEDIC SURGERY

## 2018-03-27 PROCEDURE — 87070 CULTURE OTHR SPECIMN AEROBIC: CPT | Performed by: ORTHOPAEDIC SURGERY

## 2018-03-27 PROCEDURE — 25010000002 HYDROMORPHONE PER 4 MG: Performed by: NURSE ANESTHETIST, CERTIFIED REGISTERED

## 2018-03-27 PROCEDURE — 88305 TISSUE EXAM BY PATHOLOGIST: CPT | Performed by: ORTHOPAEDIC SURGERY

## 2018-03-27 PROCEDURE — 25010000002 FENTANYL CITRATE (PF) 100 MCG/2ML SOLUTION: Performed by: ANESTHESIOLOGY

## 2018-03-27 PROCEDURE — 25010000002 PROPOFOL 1000 MG/ML EMULSION: Performed by: NURSE ANESTHETIST, CERTIFIED REGISTERED

## 2018-03-27 PROCEDURE — C1713 ANCHOR/SCREW BN/BN,TIS/BN: HCPCS | Performed by: ORTHOPAEDIC SURGERY

## 2018-03-27 PROCEDURE — 25010000002 ROPIVACAINE PER 1 MG: Performed by: ORTHOPAEDIC SURGERY

## 2018-03-27 PROCEDURE — 97162 PT EVAL MOD COMPLEX 30 MIN: CPT

## 2018-03-27 PROCEDURE — 25010000002 KETOROLAC TROMETHAMINE PER 15 MG

## 2018-03-27 PROCEDURE — 73560 X-RAY EXAM OF KNEE 1 OR 2: CPT

## 2018-03-27 PROCEDURE — 0SRC0J9 REPLACEMENT OF RIGHT KNEE JOINT WITH SYNTHETIC SUBSTITUTE, CEMENTED, OPEN APPROACH: ICD-10-PCS | Performed by: ORTHOPAEDIC SURGERY

## 2018-03-27 PROCEDURE — 25010000002 VANCOMYCIN PER 500 MG: Performed by: ORTHOPAEDIC SURGERY

## 2018-03-27 PROCEDURE — 25010000002 FENTANYL CITRATE (PF) 100 MCG/2ML SOLUTION: Performed by: NURSE ANESTHETIST, CERTIFIED REGISTERED

## 2018-03-27 PROCEDURE — 88331 PATH CONSLTJ SURG 1 BLK 1SPC: CPT | Performed by: ORTHOPAEDIC SURGERY

## 2018-03-27 PROCEDURE — 25010000003 CEFAZOLIN IN DEXTROSE 2-4 GM/100ML-% SOLUTION: Performed by: NURSE ANESTHETIST, CERTIFIED REGISTERED

## 2018-03-27 PROCEDURE — 85652 RBC SED RATE AUTOMATED: CPT | Performed by: ORTHOPAEDIC SURGERY

## 2018-03-27 PROCEDURE — 25010000002 PROPOFOL 10 MG/ML EMULSION: Performed by: NURSE ANESTHETIST, CERTIFIED REGISTERED

## 2018-03-27 PROCEDURE — 25010000002 MIDAZOLAM PER 1 MG: Performed by: ANESTHESIOLOGY

## 2018-03-27 PROCEDURE — 87205 SMEAR GRAM STAIN: CPT | Performed by: ORTHOPAEDIC SURGERY

## 2018-03-27 PROCEDURE — 25010000002 KETOROLAC TROMETHAMINE PER 15 MG: Performed by: ORTHOPAEDIC SURGERY

## 2018-03-27 DEVICE — LEGION OXINIUM CONSTRAINED FEMORAL                                    SIZE 6 RIGHT
Type: IMPLANTABLE DEVICE | Site: KNEE | Status: FUNCTIONAL
Brand: LEGION

## 2018-03-27 DEVICE — LEGION PRESSFIT STEM 18MM X 160MM
Type: IMPLANTABLE DEVICE | Site: KNEE | Status: FUNCTIONAL
Brand: LEGION

## 2018-03-27 DEVICE — IMPLANTABLE DEVICE: Type: IMPLANTABLE DEVICE | Site: KNEE | Status: FUNCTIONAL

## 2018-03-27 DEVICE — LEGION REVISION TIBIAL BASEPLATE                                    SIZE 5 RIGHT
Type: IMPLANTABLE DEVICE | Site: KNEE | Status: FUNCTIONAL
Brand: LEGION

## 2018-03-27 DEVICE — LEGION PRESSFIT STEM 15MM X 160MM
Type: IMPLANTABLE DEVICE | Site: KNEE | Status: FUNCTIONAL
Brand: LEGION

## 2018-03-27 DEVICE — GENESIS II POSTERIOR STABILIZED                                    HIGH FLEXION INSERT SIZE 5-6 11MM
Type: IMPLANTABLE DEVICE | Site: KNEE | Status: FUNCTIONAL
Brand: GENESIS II

## 2018-03-27 DEVICE — GEN II 7.5MM RESUR PAT 35MM
Type: IMPLANTABLE DEVICE | Site: KNEE | Status: FUNCTIONAL
Brand: GENESIS II

## 2018-03-27 RX ORDER — FAMOTIDINE 10 MG/ML
20 INJECTION, SOLUTION INTRAVENOUS ONCE
Status: DISCONTINUED | OUTPATIENT
Start: 2018-03-27 | End: 2018-03-27 | Stop reason: HOSPADM

## 2018-03-27 RX ORDER — GLYCOPYRROLATE 0.2 MG/ML
INJECTION INTRAMUSCULAR; INTRAVENOUS AS NEEDED
Status: DISCONTINUED | OUTPATIENT
Start: 2018-03-27 | End: 2018-03-27 | Stop reason: SURG

## 2018-03-27 RX ORDER — HYDRALAZINE HYDROCHLORIDE 20 MG/ML
5 INJECTION INTRAMUSCULAR; INTRAVENOUS
Status: DISCONTINUED | OUTPATIENT
Start: 2018-03-27 | End: 2018-03-27 | Stop reason: HOSPADM

## 2018-03-27 RX ORDER — DIPHENHYDRAMINE HYDROCHLORIDE 50 MG/ML
12.5 INJECTION INTRAMUSCULAR; INTRAVENOUS
Status: DISCONTINUED | OUTPATIENT
Start: 2018-03-27 | End: 2018-03-27 | Stop reason: HOSPADM

## 2018-03-27 RX ORDER — KETOROLAC TROMETHAMINE 15 MG/ML
INJECTION, SOLUTION INTRAMUSCULAR; INTRAVENOUS
Status: COMPLETED
Start: 2018-03-27 | End: 2018-03-27

## 2018-03-27 RX ORDER — ACETAMINOPHEN 325 MG/1
325 TABLET ORAL EVERY 4 HOURS PRN
Status: DISCONTINUED | OUTPATIENT
Start: 2018-03-27 | End: 2018-03-29 | Stop reason: HOSPADM

## 2018-03-27 RX ORDER — KETOROLAC TROMETHAMINE 30 MG/ML
15 INJECTION, SOLUTION INTRAMUSCULAR; INTRAVENOUS EVERY 8 HOURS PRN
Status: DISCONTINUED | OUTPATIENT
Start: 2018-03-27 | End: 2018-03-29 | Stop reason: HOSPADM

## 2018-03-27 RX ORDER — PROMETHAZINE HYDROCHLORIDE 25 MG/1
25 TABLET ORAL ONCE AS NEEDED
Status: DISCONTINUED | OUTPATIENT
Start: 2018-03-27 | End: 2018-03-27 | Stop reason: HOSPADM

## 2018-03-27 RX ORDER — MIDAZOLAM HYDROCHLORIDE 1 MG/ML
1 INJECTION INTRAMUSCULAR; INTRAVENOUS
Status: DISCONTINUED | OUTPATIENT
Start: 2018-03-27 | End: 2018-03-27 | Stop reason: HOSPADM

## 2018-03-27 RX ORDER — FLUMAZENIL 0.1 MG/ML
0.2 INJECTION INTRAVENOUS AS NEEDED
Status: DISCONTINUED | OUTPATIENT
Start: 2018-03-27 | End: 2018-03-27 | Stop reason: HOSPADM

## 2018-03-27 RX ORDER — PROMETHAZINE HYDROCHLORIDE 25 MG/1
12.5 TABLET ORAL ONCE AS NEEDED
Status: DISCONTINUED | OUTPATIENT
Start: 2018-03-27 | End: 2018-03-27 | Stop reason: HOSPADM

## 2018-03-27 RX ORDER — DIAZEPAM 5 MG/1
5 TABLET ORAL EVERY 6 HOURS PRN
Status: DISCONTINUED | OUTPATIENT
Start: 2018-03-27 | End: 2018-03-29 | Stop reason: HOSPADM

## 2018-03-27 RX ORDER — LIDOCAINE HYDROCHLORIDE 40 MG/ML
SOLUTION TOPICAL AS NEEDED
Status: DISCONTINUED | OUTPATIENT
Start: 2018-03-27 | End: 2018-03-27 | Stop reason: SURG

## 2018-03-27 RX ORDER — LIDOCAINE HYDROCHLORIDE 10 MG/ML
0.5 INJECTION, SOLUTION EPIDURAL; INFILTRATION; INTRACAUDAL; PERINEURAL ONCE AS NEEDED
Status: DISCONTINUED | OUTPATIENT
Start: 2018-03-27 | End: 2018-03-27 | Stop reason: HOSPADM

## 2018-03-27 RX ORDER — ACETAMINOPHEN 500 MG
1000 TABLET ORAL EVERY 6 HOURS PRN
Status: DISCONTINUED | OUTPATIENT
Start: 2018-03-27 | End: 2018-03-29 | Stop reason: HOSPADM

## 2018-03-27 RX ORDER — TRANEXAMIC ACID 100 MG/ML
INJECTION, SOLUTION INTRAVENOUS AS NEEDED
Status: DISCONTINUED | OUTPATIENT
Start: 2018-03-27 | End: 2018-03-27 | Stop reason: SURG

## 2018-03-27 RX ORDER — FENTANYL CITRATE 50 UG/ML
50 INJECTION, SOLUTION INTRAMUSCULAR; INTRAVENOUS
Status: DISCONTINUED | OUTPATIENT
Start: 2018-03-27 | End: 2018-03-27 | Stop reason: HOSPADM

## 2018-03-27 RX ORDER — MIDAZOLAM HYDROCHLORIDE 1 MG/ML
2 INJECTION INTRAMUSCULAR; INTRAVENOUS
Status: DISCONTINUED | OUTPATIENT
Start: 2018-03-27 | End: 2018-03-27 | Stop reason: HOSPADM

## 2018-03-27 RX ORDER — DEXAMETHASONE SODIUM PHOSPHATE 10 MG/ML
INJECTION INTRAMUSCULAR; INTRAVENOUS AS NEEDED
Status: DISCONTINUED | OUTPATIENT
Start: 2018-03-27 | End: 2018-03-27 | Stop reason: SURG

## 2018-03-27 RX ORDER — CEFAZOLIN SODIUM 2 G/100ML
2 INJECTION, SOLUTION INTRAVENOUS ONCE
Status: DISCONTINUED | OUTPATIENT
Start: 2018-03-27 | End: 2018-03-27

## 2018-03-27 RX ORDER — NALOXONE HCL 0.4 MG/ML
0.2 VIAL (ML) INJECTION AS NEEDED
Status: DISCONTINUED | OUTPATIENT
Start: 2018-03-27 | End: 2018-03-27 | Stop reason: HOSPADM

## 2018-03-27 RX ORDER — DIAZEPAM 5 MG/ML
5 INJECTION, SOLUTION INTRAMUSCULAR; INTRAVENOUS 2 TIMES DAILY PRN
Status: DISCONTINUED | OUTPATIENT
Start: 2018-03-27 | End: 2018-03-27 | Stop reason: RX

## 2018-03-27 RX ORDER — SENNA AND DOCUSATE SODIUM 50; 8.6 MG/1; MG/1
2 TABLET, FILM COATED ORAL 2 TIMES DAILY PRN
Status: DISCONTINUED | OUTPATIENT
Start: 2018-03-27 | End: 2018-03-29 | Stop reason: HOSPADM

## 2018-03-27 RX ORDER — HYDROCODONE BITARTRATE AND ACETAMINOPHEN 7.5; 325 MG/1; MG/1
1 TABLET ORAL ONCE AS NEEDED
Status: DISCONTINUED | OUTPATIENT
Start: 2018-03-27 | End: 2018-03-27 | Stop reason: HOSPADM

## 2018-03-27 RX ORDER — HYDROMORPHONE HCL 110MG/55ML
PATIENT CONTROLLED ANALGESIA SYRINGE INTRAVENOUS AS NEEDED
Status: DISCONTINUED | OUTPATIENT
Start: 2018-03-27 | End: 2018-03-27 | Stop reason: SURG

## 2018-03-27 RX ORDER — ONDANSETRON 2 MG/ML
INJECTION INTRAMUSCULAR; INTRAVENOUS AS NEEDED
Status: DISCONTINUED | OUTPATIENT
Start: 2018-03-27 | End: 2018-03-27 | Stop reason: SURG

## 2018-03-27 RX ORDER — SODIUM CHLORIDE, SODIUM LACTATE, POTASSIUM CHLORIDE, CALCIUM CHLORIDE 600; 310; 30; 20 MG/100ML; MG/100ML; MG/100ML; MG/100ML
9 INJECTION, SOLUTION INTRAVENOUS CONTINUOUS
Status: DISCONTINUED | OUTPATIENT
Start: 2018-03-27 | End: 2018-03-29 | Stop reason: HOSPADM

## 2018-03-27 RX ORDER — NALOXONE HCL 0.4 MG/ML
0.4 VIAL (ML) INJECTION
Status: DISCONTINUED | OUTPATIENT
Start: 2018-03-27 | End: 2018-03-29 | Stop reason: HOSPADM

## 2018-03-27 RX ORDER — CLINDAMYCIN PHOSPHATE 900 MG/50ML
900 INJECTION INTRAVENOUS EVERY 8 HOURS
Status: COMPLETED | OUTPATIENT
Start: 2018-03-27 | End: 2018-03-28

## 2018-03-27 RX ORDER — ONDANSETRON 2 MG/ML
4 INJECTION INTRAMUSCULAR; INTRAVENOUS EVERY 6 HOURS PRN
Status: DISCONTINUED | OUTPATIENT
Start: 2018-03-27 | End: 2018-03-29 | Stop reason: HOSPADM

## 2018-03-27 RX ORDER — LIDOCAINE HYDROCHLORIDE 20 MG/ML
INJECTION, SOLUTION INFILTRATION; PERINEURAL AS NEEDED
Status: DISCONTINUED | OUTPATIENT
Start: 2018-03-27 | End: 2018-03-27 | Stop reason: SURG

## 2018-03-27 RX ORDER — EPHEDRINE SULFATE 50 MG/ML
5 INJECTION, SOLUTION INTRAVENOUS ONCE AS NEEDED
Status: DISCONTINUED | OUTPATIENT
Start: 2018-03-27 | End: 2018-03-27 | Stop reason: HOSPADM

## 2018-03-27 RX ORDER — NEOSTIGMINE METHYLSULFATE 1 MG/ML
INJECTION, SOLUTION INTRAVENOUS AS NEEDED
Status: DISCONTINUED | OUTPATIENT
Start: 2018-03-27 | End: 2018-03-27 | Stop reason: SURG

## 2018-03-27 RX ORDER — HYDROMORPHONE HCL 110MG/55ML
0.5 PATIENT CONTROLLED ANALGESIA SYRINGE INTRAVENOUS
Status: DISCONTINUED | OUTPATIENT
Start: 2018-03-27 | End: 2018-03-27 | Stop reason: HOSPADM

## 2018-03-27 RX ORDER — ASPIRIN 325 MG
325 TABLET, DELAYED RELEASE (ENTERIC COATED) ORAL 2 TIMES DAILY WITH MEALS
Status: DISCONTINUED | OUTPATIENT
Start: 2018-03-27 | End: 2018-03-29 | Stop reason: HOSPADM

## 2018-03-27 RX ORDER — ONDANSETRON 4 MG/1
4 TABLET, FILM COATED ORAL EVERY 6 HOURS PRN
Status: DISCONTINUED | OUTPATIENT
Start: 2018-03-27 | End: 2018-03-29 | Stop reason: HOSPADM

## 2018-03-27 RX ORDER — MECLIZINE HYDROCHLORIDE 25 MG/1
25 TABLET ORAL 3 TIMES DAILY PRN
Status: DISCONTINUED | OUTPATIENT
Start: 2018-03-27 | End: 2018-03-29 | Stop reason: HOSPADM

## 2018-03-27 RX ORDER — ONDANSETRON 4 MG/1
4 TABLET, ORALLY DISINTEGRATING ORAL EVERY 6 HOURS PRN
Status: DISCONTINUED | OUTPATIENT
Start: 2018-03-27 | End: 2018-03-29 | Stop reason: HOSPADM

## 2018-03-27 RX ORDER — PROMETHAZINE HYDROCHLORIDE 25 MG/1
25 SUPPOSITORY RECTAL ONCE AS NEEDED
Status: DISCONTINUED | OUTPATIENT
Start: 2018-03-27 | End: 2018-03-27 | Stop reason: HOSPADM

## 2018-03-27 RX ORDER — ROCURONIUM BROMIDE 10 MG/ML
INJECTION, SOLUTION INTRAVENOUS AS NEEDED
Status: DISCONTINUED | OUTPATIENT
Start: 2018-03-27 | End: 2018-03-27 | Stop reason: SURG

## 2018-03-27 RX ORDER — FERROUS SULFATE 325(65) MG
325 TABLET ORAL
Status: DISCONTINUED | OUTPATIENT
Start: 2018-03-28 | End: 2018-03-29 | Stop reason: HOSPADM

## 2018-03-27 RX ORDER — BISACODYL 10 MG
10 SUPPOSITORY, RECTAL RECTAL DAILY PRN
Status: DISCONTINUED | OUTPATIENT
Start: 2018-03-27 | End: 2018-03-29 | Stop reason: HOSPADM

## 2018-03-27 RX ORDER — MORPHINE SULFATE 2 MG/ML
6 INJECTION, SOLUTION INTRAMUSCULAR; INTRAVENOUS
Status: DISCONTINUED | OUTPATIENT
Start: 2018-03-27 | End: 2018-03-29 | Stop reason: HOSPADM

## 2018-03-27 RX ORDER — SODIUM CHLORIDE 0.9 % (FLUSH) 0.9 %
1-10 SYRINGE (ML) INJECTION AS NEEDED
Status: DISCONTINUED | OUTPATIENT
Start: 2018-03-27 | End: 2018-03-27 | Stop reason: HOSPADM

## 2018-03-27 RX ORDER — CELECOXIB 200 MG/1
200 CAPSULE ORAL ONCE
Status: COMPLETED | OUTPATIENT
Start: 2018-03-27 | End: 2018-03-27

## 2018-03-27 RX ORDER — OXYCODONE HYDROCHLORIDE AND ACETAMINOPHEN 5; 325 MG/1; MG/1
2 TABLET ORAL EVERY 4 HOURS PRN
Status: DISCONTINUED | OUTPATIENT
Start: 2018-03-27 | End: 2018-03-29 | Stop reason: HOSPADM

## 2018-03-27 RX ORDER — FAMOTIDINE 10 MG/ML
20 INJECTION, SOLUTION INTRAVENOUS ONCE
Status: COMPLETED | OUTPATIENT
Start: 2018-03-27 | End: 2018-03-27

## 2018-03-27 RX ORDER — SODIUM CHLORIDE 450 MG/100ML
100 INJECTION, SOLUTION INTRAVENOUS CONTINUOUS
Status: DISCONTINUED | OUTPATIENT
Start: 2018-03-27 | End: 2018-03-29 | Stop reason: HOSPADM

## 2018-03-27 RX ORDER — LABETALOL HYDROCHLORIDE 5 MG/ML
5 INJECTION, SOLUTION INTRAVENOUS
Status: DISCONTINUED | OUTPATIENT
Start: 2018-03-27 | End: 2018-03-27 | Stop reason: HOSPADM

## 2018-03-27 RX ORDER — CEFAZOLIN SODIUM 2 G/100ML
INJECTION, SOLUTION INTRAVENOUS AS NEEDED
Status: DISCONTINUED | OUTPATIENT
Start: 2018-03-27 | End: 2018-03-27 | Stop reason: SURG

## 2018-03-27 RX ORDER — MAGNESIUM HYDROXIDE 1200 MG/15ML
LIQUID ORAL AS NEEDED
Status: DISCONTINUED | OUTPATIENT
Start: 2018-03-27 | End: 2018-03-27 | Stop reason: HOSPADM

## 2018-03-27 RX ORDER — ONDANSETRON 2 MG/ML
4 INJECTION INTRAMUSCULAR; INTRAVENOUS ONCE AS NEEDED
Status: DISCONTINUED | OUTPATIENT
Start: 2018-03-27 | End: 2018-03-27 | Stop reason: HOSPADM

## 2018-03-27 RX ORDER — ACETAMINOPHEN 500 MG
1000 TABLET ORAL ONCE
Status: COMPLETED | OUTPATIENT
Start: 2018-03-27 | End: 2018-03-27

## 2018-03-27 RX ORDER — HYDROCODONE BITARTRATE AND ACETAMINOPHEN 10; 325 MG/1; MG/1
2 TABLET ORAL EVERY 4 HOURS PRN
Status: DISCONTINUED | OUTPATIENT
Start: 2018-03-27 | End: 2018-03-29 | Stop reason: HOSPADM

## 2018-03-27 RX ORDER — VANCOMYCIN HYDROCHLORIDE 1 G/200ML
1000 INJECTION, SOLUTION INTRAVENOUS ONCE
Status: COMPLETED | OUTPATIENT
Start: 2018-03-27 | End: 2018-03-27

## 2018-03-27 RX ORDER — CLINDAMYCIN PHOSPHATE 900 MG/50ML
900 INJECTION INTRAVENOUS ONCE
Status: DISCONTINUED | OUTPATIENT
Start: 2018-03-27 | End: 2018-03-27

## 2018-03-27 RX ORDER — OXYCODONE HYDROCHLORIDE AND ACETAMINOPHEN 5; 325 MG/1; MG/1
1 TABLET ORAL EVERY 4 HOURS PRN
Status: DISCONTINUED | OUTPATIENT
Start: 2018-03-27 | End: 2018-03-29 | Stop reason: HOSPADM

## 2018-03-27 RX ORDER — PROMETHAZINE HYDROCHLORIDE 25 MG/ML
12.5 INJECTION, SOLUTION INTRAMUSCULAR; INTRAVENOUS ONCE AS NEEDED
Status: DISCONTINUED | OUTPATIENT
Start: 2018-03-27 | End: 2018-03-27 | Stop reason: HOSPADM

## 2018-03-27 RX ORDER — DIAZEPAM 5 MG/ML
5 INJECTION, SOLUTION INTRAMUSCULAR; INTRAVENOUS 2 TIMES DAILY PRN
Status: DISCONTINUED | OUTPATIENT
Start: 2018-03-27 | End: 2018-03-27

## 2018-03-27 RX ORDER — DOCUSATE SODIUM 100 MG/1
100 CAPSULE, LIQUID FILLED ORAL 2 TIMES DAILY PRN
Status: DISCONTINUED | OUTPATIENT
Start: 2018-03-27 | End: 2018-03-29 | Stop reason: HOSPADM

## 2018-03-27 RX ORDER — PROPOFOL 10 MG/ML
VIAL (ML) INTRAVENOUS AS NEEDED
Status: DISCONTINUED | OUTPATIENT
Start: 2018-03-27 | End: 2018-03-27 | Stop reason: SURG

## 2018-03-27 RX ORDER — SODIUM CHLORIDE 0.9 % (FLUSH) 0.9 %
1-10 SYRINGE (ML) INJECTION AS NEEDED
Status: DISCONTINUED | OUTPATIENT
Start: 2018-03-27 | End: 2018-03-29 | Stop reason: HOSPADM

## 2018-03-27 RX ORDER — HYDROCODONE BITARTRATE AND ACETAMINOPHEN 10; 325 MG/1; MG/1
1 TABLET ORAL EVERY 4 HOURS PRN
Status: DISCONTINUED | OUTPATIENT
Start: 2018-03-27 | End: 2018-03-29 | Stop reason: HOSPADM

## 2018-03-27 RX ORDER — OXYCODONE AND ACETAMINOPHEN 7.5; 325 MG/1; MG/1
1 TABLET ORAL ONCE AS NEEDED
Status: DISCONTINUED | OUTPATIENT
Start: 2018-03-27 | End: 2018-03-27 | Stop reason: HOSPADM

## 2018-03-27 RX ORDER — CHOLECALCIFEROL (VITAMIN D3) 125 MCG
5 CAPSULE ORAL NIGHTLY PRN
Status: DISCONTINUED | OUTPATIENT
Start: 2018-03-27 | End: 2018-03-29 | Stop reason: HOSPADM

## 2018-03-27 RX ADMIN — ASPIRIN 325 MG: 325 TABLET, DELAYED RELEASE ORAL at 17:36

## 2018-03-27 RX ADMIN — VANCOMYCIN HYDROCHLORIDE 1000 MG: 1 INJECTION, SOLUTION INTRAVENOUS at 09:19

## 2018-03-27 RX ADMIN — ACETAMINOPHEN 1000 MG: 500 TABLET ORAL at 09:15

## 2018-03-27 RX ADMIN — HYDROCODONE BITARTRATE AND ACETAMINOPHEN 2 TABLET: 10; 325 TABLET ORAL at 23:27

## 2018-03-27 RX ADMIN — FAMOTIDINE 20 MG: 10 INJECTION INTRAVENOUS at 10:45

## 2018-03-27 RX ADMIN — FENTANYL CITRATE 100 MCG: 50 INJECTION INTRAMUSCULAR; INTRAVENOUS at 11:17

## 2018-03-27 RX ADMIN — CLINDAMYCIN PHOSPHATE 900 MG: 18 INJECTION, SOLUTION INTRAMUSCULAR; INTRAVENOUS at 23:27

## 2018-03-27 RX ADMIN — PROPOFOL 200 MG: 10 INJECTION, EMULSION INTRAVENOUS at 11:02

## 2018-03-27 RX ADMIN — OXYCODONE HYDROCHLORIDE AND ACETAMINOPHEN 2 TABLET: 5; 325 TABLET ORAL at 14:58

## 2018-03-27 RX ADMIN — KETOROLAC TROMETHAMINE 15 MG: 30 INJECTION, SOLUTION INTRAMUSCULAR at 13:41

## 2018-03-27 RX ADMIN — FENTANYL CITRATE 50 MCG: 50 INJECTION, SOLUTION INTRAMUSCULAR; INTRAVENOUS at 13:25

## 2018-03-27 RX ADMIN — FENTANYL CITRATE 50 MCG: 50 INJECTION INTRAMUSCULAR; INTRAVENOUS at 10:43

## 2018-03-27 RX ADMIN — HYDROMORPHONE HYDROCHLORIDE 0.5 MG: 2 INJECTION INTRAMUSCULAR; INTRAVENOUS; SUBCUTANEOUS at 12:09

## 2018-03-27 RX ADMIN — KETOROLAC TROMETHAMINE 15 MG: 30 INJECTION, SOLUTION INTRAMUSCULAR at 23:27

## 2018-03-27 RX ADMIN — HYDROMORPHONE HYDROCHLORIDE 0.5 MG: 2 INJECTION INTRAMUSCULAR; INTRAVENOUS; SUBCUTANEOUS at 13:43

## 2018-03-27 RX ADMIN — SODIUM CHLORIDE 100 ML/HR: 4.5 INJECTION, SOLUTION INTRAVENOUS at 21:33

## 2018-03-27 RX ADMIN — HYDROMORPHONE HYDROCHLORIDE 0.5 MG: 2 INJECTION INTRAMUSCULAR; INTRAVENOUS; SUBCUTANEOUS at 13:52

## 2018-03-27 RX ADMIN — FENTANYL CITRATE 100 MCG: 50 INJECTION INTRAMUSCULAR; INTRAVENOUS at 11:02

## 2018-03-27 RX ADMIN — HYDROCODONE BITARTRATE AND ACETAMINOPHEN 2 TABLET: 10; 325 TABLET ORAL at 19:15

## 2018-03-27 RX ADMIN — TRANEXAMIC ACID 1000 MG: 100 INJECTION, SOLUTION INTRAVENOUS at 12:10

## 2018-03-27 RX ADMIN — ROCURONIUM BROMIDE 40 MG: 10 INJECTION INTRAVENOUS at 11:02

## 2018-03-27 RX ADMIN — MIDAZOLAM HYDROCHLORIDE 2 MG: 1 INJECTION, SOLUTION INTRAMUSCULAR; INTRAVENOUS at 10:44

## 2018-03-27 RX ADMIN — SODIUM CHLORIDE, POTASSIUM CHLORIDE, SODIUM LACTATE AND CALCIUM CHLORIDE: 600; 310; 30; 20 INJECTION, SOLUTION INTRAVENOUS at 10:59

## 2018-03-27 RX ADMIN — CLINDAMYCIN PHOSPHATE 900 MG: 18 INJECTION, SOLUTION INTRAMUSCULAR; INTRAVENOUS at 16:43

## 2018-03-27 RX ADMIN — FENTANYL CITRATE 50 MCG: 50 INJECTION, SOLUTION INTRAMUSCULAR; INTRAVENOUS at 13:38

## 2018-03-27 RX ADMIN — HYDROMORPHONE HYDROCHLORIDE 0.5 MG: 2 INJECTION INTRAMUSCULAR; INTRAVENOUS; SUBCUTANEOUS at 13:59

## 2018-03-27 RX ADMIN — EPHEDRINE SULFATE 10 MG: 50 INJECTION INTRAMUSCULAR; INTRAVENOUS; SUBCUTANEOUS at 11:26

## 2018-03-27 RX ADMIN — FENTANYL CITRATE 50 MCG: 50 INJECTION INTRAMUSCULAR; INTRAVENOUS at 11:40

## 2018-03-27 RX ADMIN — GLYCOPYRROLATE 0.4 MG: 0.2 INJECTION INTRAMUSCULAR; INTRAVENOUS at 12:58

## 2018-03-27 RX ADMIN — LIDOCAINE HYDROCHLORIDE 1 EACH: 40 SPRAY LARYNGEAL; TRANSTRACHEAL at 11:04

## 2018-03-27 RX ADMIN — NEOSTIGMINE METHYLSULFATE 2 MG: 1 INJECTION INTRAVENOUS at 12:58

## 2018-03-27 RX ADMIN — DEXAMETHASONE SODIUM PHOSPHATE 8 MG: 10 INJECTION INTRAMUSCULAR; INTRAVENOUS at 11:20

## 2018-03-27 RX ADMIN — SODIUM CHLORIDE, POTASSIUM CHLORIDE, SODIUM LACTATE AND CALCIUM CHLORIDE: 600; 310; 30; 20 INJECTION, SOLUTION INTRAVENOUS at 12:39

## 2018-03-27 RX ADMIN — KETOROLAC TROMETHAMINE 15 MG: 15 INJECTION, SOLUTION INTRAMUSCULAR; INTRAVENOUS at 13:41

## 2018-03-27 RX ADMIN — PROPOFOL 100 MCG/KG/MIN: 10 INJECTION, EMULSION INTRAVENOUS at 11:02

## 2018-03-27 RX ADMIN — LIDOCAINE HYDROCHLORIDE 20 MG: 20 INJECTION, SOLUTION INFILTRATION; PERINEURAL at 11:02

## 2018-03-27 RX ADMIN — HYDROMORPHONE HYDROCHLORIDE 0.5 MG: 2 INJECTION INTRAMUSCULAR; INTRAVENOUS; SUBCUTANEOUS at 12:03

## 2018-03-27 RX ADMIN — ONDANSETRON 4 MG: 2 INJECTION INTRAMUSCULAR; INTRAVENOUS at 12:38

## 2018-03-27 RX ADMIN — HYDROMORPHONE HYDROCHLORIDE 0.5 MG: 2 INJECTION INTRAMUSCULAR; INTRAVENOUS; SUBCUTANEOUS at 13:30

## 2018-03-27 RX ADMIN — CELECOXIB 200 MG: 200 CAPSULE ORAL at 09:15

## 2018-03-27 RX ADMIN — CEFAZOLIN SODIUM 2 G: 2 INJECTION, SOLUTION INTRAVENOUS at 12:48

## 2018-03-27 NOTE — ANESTHESIA PREPROCEDURE EVALUATION
Anesthesia Evaluation     Patient summary reviewed and Nursing notes reviewed                Airway   Mallampati: II  No difficulty expected  Dental    (+) poor dentition    Pulmonary    (+) shortness of breath, sleep apnea,   Cardiovascular     ECG reviewed  Rhythm: regular  Rate: normal    (+) CHF, hyperlipidemia,       Neuro/Psych- negative ROS  GI/Hepatic/Renal/Endo    (+)  GERD,      Musculoskeletal     Abdominal    Substance History - negative use     OB/GYN negative ob/gyn ROS         Other   (+) arthritis                     Anesthesia Plan    ASA 3     general   (TIVA  Patient had a very bad experience with previous surgery under Inhalational [facility] therefore a propofol TIVA to be used on this surgery  Patient had N/V and SOB upon emergence with the last anesthetic as well but refuses scopolamine today  Very poor dentition)  intravenous induction   Anesthetic plan and risks discussed with patient.

## 2018-03-27 NOTE — ANESTHESIA POSTPROCEDURE EVALUATION
Patient: Rafael Bernstein    Procedure Summary     Date:  03/27/18 Room / Location:  [facility] OR  / [facility] MAIN OR    Anesthesia Start:  1056 Anesthesia Stop:  1316    Procedure:  RT TOTAL KNEE ARTHROPLASTY REVISION REIMPLANTATION (Right Knee) Diagnosis:      Surgeon:  Wally Ventura MD Provider:  Mohan Burgess MD    Anesthesia Type:  general ASA Status:  3          Anesthesia Type: general  Last vitals  BP   144/96 (03/27/18 1325)   Temp   36.6 °C (97.8 °F) (03/27/18 1313)   Pulse   72 (03/27/18 1332)   Resp   20 (03/27/18 1325)     SpO2   99 % (03/27/18 1332)     Post Anesthesia Care and Evaluation    Patient location during evaluation: PACU  Patient participation: complete - patient participated  Level of consciousness: awake and alert  Pain management: adequate  Airway patency: patent  Anesthetic complications: No anesthetic complications    Cardiovascular status: acceptable  Respiratory status: acceptable  Hydration status: acceptable    Comments: --------------------            03/27/18               1332     --------------------   BP:                  Pulse:      72       Resp:                Temp:                SpO2:      99%      --------------------

## 2018-03-27 NOTE — ANESTHESIA PROCEDURE NOTES
Airway  Urgency: elective    Date/Time: 3/27/2018 11:04 AM  Airway not difficult    General Information and Staff    Patient location during procedure: OR  Anesthesiologist: MINA FORREST  CRNA: KD NGUYEN    Indications and Patient Condition  Indications for airway management: airway protection    Preoxygenated: yes  Mask difficulty assessment: 1 - vent by mask    Final Airway Details  Final airway type: endotracheal airway      Successful airway: ETT  Cuffed: yes   Successful intubation technique: direct laryngoscopy  Facilitating devices/methods: intubating stylet and anterior pressure/BURP  Endotracheal tube insertion site: oral  Blade: Rivas  Blade size: #2  ETT size: 8.0 mm  Cormack-Lehane Classification: grade IIa - partial view of glottis  Placement verified by: chest auscultation and capnometry   Cuff volume (mL): 7  Measured from: teeth  ETT to teeth (cm): 21  Number of attempts at approach: 1

## 2018-03-28 LAB
ANION GAP SERPL CALCULATED.3IONS-SCNC: 14.1 MMOL/L
BUN BLD-MCNC: 21 MG/DL (ref 8–23)
BUN/CREAT SERPL: 21.4 (ref 7–25)
CALCIUM SPEC-SCNC: 8.7 MG/DL (ref 8.6–10.5)
CHLORIDE SERPL-SCNC: 104 MMOL/L (ref 98–107)
CO2 SERPL-SCNC: 20.9 MMOL/L (ref 22–29)
CREAT BLD-MCNC: 0.98 MG/DL (ref 0.76–1.27)
CYTO UR: NORMAL
GFR SERPL CREATININE-BSD FRML MDRD: 77 ML/MIN/1.73
GLUCOSE BLD-MCNC: 137 MG/DL (ref 65–99)
HCT VFR BLD AUTO: 35.1 % (ref 40.4–52.2)
HGB BLD-MCNC: 11.2 G/DL (ref 13.7–17.6)
LAB AP CASE REPORT: NORMAL
Lab: NORMAL
Lab: NORMAL
PATH REPORT.FINAL DX SPEC: NORMAL
PATH REPORT.GROSS SPEC: NORMAL
POTASSIUM BLD-SCNC: 4.8 MMOL/L (ref 3.5–5.2)
SODIUM BLD-SCNC: 139 MMOL/L (ref 136–145)

## 2018-03-28 PROCEDURE — 80048 BASIC METABOLIC PNL TOTAL CA: CPT | Performed by: ORTHOPAEDIC SURGERY

## 2018-03-28 PROCEDURE — 85018 HEMOGLOBIN: CPT | Performed by: ORTHOPAEDIC SURGERY

## 2018-03-28 PROCEDURE — 97150 GROUP THERAPEUTIC PROCEDURES: CPT

## 2018-03-28 PROCEDURE — 97110 THERAPEUTIC EXERCISES: CPT

## 2018-03-28 PROCEDURE — 85014 HEMATOCRIT: CPT | Performed by: ORTHOPAEDIC SURGERY

## 2018-03-28 RX ADMIN — HYDROCODONE BITARTRATE AND ACETAMINOPHEN 2 TABLET: 10; 325 TABLET ORAL at 12:17

## 2018-03-28 RX ADMIN — HYDROCODONE BITARTRATE AND ACETAMINOPHEN 1 TABLET: 10; 325 TABLET ORAL at 21:57

## 2018-03-28 RX ADMIN — HYDROCODONE BITARTRATE AND ACETAMINOPHEN 1 TABLET: 10; 325 TABLET ORAL at 17:33

## 2018-03-28 RX ADMIN — DOCUSATE SODIUM -SENNOSIDES 2 TABLET: 50; 8.6 TABLET, COATED ORAL at 12:17

## 2018-03-28 RX ADMIN — MUPIROCIN: 20 OINTMENT TOPICAL at 08:11

## 2018-03-28 RX ADMIN — ASPIRIN 325 MG: 325 TABLET, DELAYED RELEASE ORAL at 17:33

## 2018-03-28 RX ADMIN — HYDROCODONE BITARTRATE AND ACETAMINOPHEN 2 TABLET: 10; 325 TABLET ORAL at 03:33

## 2018-03-28 RX ADMIN — HYDROCODONE BITARTRATE AND ACETAMINOPHEN 2 TABLET: 10; 325 TABLET ORAL at 08:11

## 2018-03-28 RX ADMIN — MUPIROCIN: 20 OINTMENT TOPICAL at 20:35

## 2018-03-28 RX ADMIN — ASPIRIN 325 MG: 325 TABLET, DELAYED RELEASE ORAL at 08:11

## 2018-03-29 VITALS
TEMPERATURE: 97.8 F | WEIGHT: 235 LBS | HEART RATE: 65 BPM | HEIGHT: 70 IN | SYSTOLIC BLOOD PRESSURE: 111 MMHG | BODY MASS INDEX: 33.64 KG/M2 | OXYGEN SATURATION: 96 % | RESPIRATION RATE: 16 BRPM | DIASTOLIC BLOOD PRESSURE: 71 MMHG

## 2018-03-29 LAB
HCT VFR BLD AUTO: 35.2 % (ref 40.4–52.2)
HGB BLD-MCNC: 11.1 G/DL (ref 13.7–17.6)

## 2018-03-29 PROCEDURE — 85018 HEMOGLOBIN: CPT | Performed by: ORTHOPAEDIC SURGERY

## 2018-03-29 PROCEDURE — 97150 GROUP THERAPEUTIC PROCEDURES: CPT

## 2018-03-29 PROCEDURE — 85014 HEMATOCRIT: CPT | Performed by: ORTHOPAEDIC SURGERY

## 2018-03-29 PROCEDURE — 97110 THERAPEUTIC EXERCISES: CPT

## 2018-03-29 RX ORDER — HYDROCODONE BITARTRATE AND ACETAMINOPHEN 10; 325 MG/1; MG/1
1 TABLET ORAL EVERY 4 HOURS PRN
Qty: 80 TABLET | Refills: 0 | Status: SHIPPED | OUTPATIENT
Start: 2018-03-29

## 2018-03-29 RX ORDER — PSEUDOEPHEDRINE HCL 30 MG
100 TABLET ORAL 2 TIMES DAILY PRN
Qty: 40 CAPSULE | Refills: 1 | Status: SHIPPED | OUTPATIENT
Start: 2018-03-29

## 2018-03-29 RX ADMIN — HYDROCODONE BITARTRATE AND ACETAMINOPHEN 1 TABLET: 10; 325 TABLET ORAL at 02:53

## 2018-03-29 RX ADMIN — HYDROCODONE BITARTRATE AND ACETAMINOPHEN 2 TABLET: 10; 325 TABLET ORAL at 06:46

## 2018-03-29 RX ADMIN — DOCUSATE SODIUM 100 MG: 100 CAPSULE, LIQUID FILLED ORAL at 08:31

## 2018-03-29 RX ADMIN — OXYCODONE HYDROCHLORIDE AND ACETAMINOPHEN 2 TABLET: 5; 325 TABLET ORAL at 10:24

## 2018-03-29 RX ADMIN — ASPIRIN 325 MG: 325 TABLET, DELAYED RELEASE ORAL at 08:31

## 2018-03-30 LAB
BACTERIA SPEC AEROBE CULT: NO GROWTH
GRAM STN SPEC: NORMAL

## 2018-04-01 LAB — BACTERIA SPEC ANAEROBE CULT: NORMAL

## 2019-04-08 ENCOUNTER — HOSPITAL ENCOUNTER (OUTPATIENT)
Facility: HOSPITAL | Age: 64
Setting detail: HOSPITAL OUTPATIENT SURGERY
End: 2019-04-08
Attending: INTERNAL MEDICINE | Admitting: INTERNAL MEDICINE

## 2019-04-08 ENCOUNTER — OFFICE VISIT (OUTPATIENT)
Dept: GASTROENTEROLOGY | Facility: CLINIC | Age: 64
End: 2019-04-08

## 2019-04-08 VITALS
BODY MASS INDEX: 35.5 KG/M2 | DIASTOLIC BLOOD PRESSURE: 78 MMHG | SYSTOLIC BLOOD PRESSURE: 130 MMHG | WEIGHT: 248 LBS | HEIGHT: 70 IN | TEMPERATURE: 98.1 F

## 2019-04-08 DIAGNOSIS — K63.5 POLYP OF COLON, UNSPECIFIED PART OF COLON, UNSPECIFIED TYPE: ICD-10-CM

## 2019-04-08 DIAGNOSIS — E83.110 HEREDITARY HEMOCHROMATOSIS (HCC): Primary | ICD-10-CM

## 2019-04-08 DIAGNOSIS — K21.9 GASTROESOPHAGEAL REFLUX DISEASE, ESOPHAGITIS PRESENCE NOT SPECIFIED: ICD-10-CM

## 2019-04-08 PROCEDURE — 99214 OFFICE O/P EST MOD 30 MIN: CPT | Performed by: INTERNAL MEDICINE

## 2019-04-08 RX ORDER — CLINDAMYCIN HYDROCHLORIDE 150 MG/1
CAPSULE ORAL
Refills: 0 | COMMUNITY
Start: 2019-03-27

## 2019-04-08 NOTE — PROGRESS NOTES
Chief Complaint   Patient presents with   • Hemochromatosis        Rafael Bernstein is a  64 y.o. male here for a follow up visit for hemochromatosis, GERD, history of polyps    HPI this 64-year-old white male patient of Dr. Trevor De Anda who has been followed in the past for hemochromatosis, reflux, and polyps.  His last phlebotomy according to chart records was in February 2017.  He has had extensive orthopedic surgery on his right knee that has required replacement of his initial prosthetic, subsequent treatment for infection, and then an alternative prosthetic placement.  He also recounts recent issues with malaise lethargy and shakiness that he attributes to iron overload.  I am going to order a CBC and iron studies including ferritin at this point.  We also talked about colonoscopic evaluation given his history of polyps and he would be due for reassessment this year.  His reflux is also been a recent issue and I would offer both upper and lower endoscopy in that setting.  He has gone on disability and may need to use resources from the [employer] to have the studies done.  He is going to research this.    Past Medical History:   Diagnosis Date   • Arthritis    • Benign positional vertigo    • CHF (congestive heart failure) (CMS/HCC)     2012   • Colon polyps    • GERD (gastroesophageal reflux disease)    • Hemochromatosis    • History of mononucleosis    • Hyperlipidemia    • PONV (postoperative nausea and vomiting)    • Sleep apnea     DOES NOT USE MACHINE   • SOB (shortness of breath)        Current Outpatient Medications   Medication Sig Dispense Refill   • meclizine (ANTIVERT) 25 MG tablet Take 25 mg by mouth 3 (Three) Times a Day As Needed for dizziness.     • sertraline (ZOLOFT) 50 MG tablet      • acetaminophen (TYLENOL) 500 MG tablet Take 1,000 mg by mouth Every 6 (Six) Hours As Needed for Mild Pain .     • aspirin  MG EC tablet Take 1 tablet by mouth 2 (Two) Times a Day With  Meals. 60 tablet 0   • clindamycin (CLEOCIN) 150 MG capsule TAKE 4 CAPSULES BY MOUTH ONE HOUR BEFORE PROCEDURE  0   • docusate sodium 100 MG capsule Take 100 mg by mouth 2 (Two) Times a Day As Needed for Constipation. 40 capsule 1   • HYDROcodone-acetaminophen (NORCO)  MG per tablet Take 1 tablet by mouth Every 6 (Six) Hours As Needed.     • HYDROcodone-acetaminophen (NORCO)  MG per tablet Take 1 tablet by mouth Every 4 (Four) Hours As Needed for Moderate Pain  (Pain). 80 tablet 0     No current facility-administered medications for this visit.        PRN Meds:.    Allergies   Allergen Reactions   • Chromium Swelling and Other (See Comments)     INFECTION - REJECTION OF IMPLANT   • Nickel Swelling and Other (See Comments)     INFECTION - REJECTION OF IMPLANT       Social History     Socioeconomic History   • Marital status:      Spouse name: Not on file   • Number of children: 2   • Years of education: Not on file   • Highest education level: Not on file   Occupational History   • Occupation: food    Tobacco Use   • Smoking status: Former Smoker     Packs/day: 2.00     Years: 46.00     Pack years: 92.00     Types: Cigarettes     Last attempt to quit: 2017     Years since quittin.5   • Smokeless tobacco: Never Used   Substance and Sexual Activity   • Alcohol use: No   • Sexual activity: Defer       Family History   Problem Relation Age of Onset   • Colon cancer Brother    • Bone cancer Father 76   • Prostate cancer Brother    • Cancer Mother 65   • Cancer Sister 53   • Hypertension Brother    • Malig Hyperthermia Neg Hx        Review of Systems   Constitutional: Positive for fatigue. Negative for activity change, appetite change and unexpected weight change.   HENT: Negative for congestion, facial swelling, sore throat, trouble swallowing and voice change.    Eyes: Negative for photophobia and visual disturbance.   Respiratory: Negative for cough and choking.     Cardiovascular: Negative for chest pain.   Gastrointestinal: Negative for abdominal distention, abdominal pain, anal bleeding, blood in stool, constipation, diarrhea, nausea, rectal pain and vomiting.        GERD   Endocrine: Negative for polyphagia.   Musculoskeletal: Negative for arthralgias, gait problem and joint swelling.   Skin: Negative for color change, pallor and rash.   Allergic/Immunologic: Negative for food allergies.   Neurological: Negative for speech difficulty and headaches.   Hematological: Does not bruise/bleed easily.   Psychiatric/Behavioral: Negative for agitation, confusion and sleep disturbance.       Vitals:    04/08/19 1458   BP: 130/78   Temp: 98.1 °F (36.7 °C)       Physical Exam   Constitutional: He is oriented to person, place, and time. He appears well-developed and well-nourished.   HENT:   Head: Normocephalic.   Mouth/Throat: Oropharynx is clear and moist.   Eyes: Conjunctivae and EOM are normal.   Neck: Normal range of motion.   Cardiovascular: Normal rate and regular rhythm.   Pulmonary/Chest: Breath sounds normal.   Abdominal: Soft. Bowel sounds are normal.   Musculoskeletal: Normal range of motion.   Neurological: He is alert and oriented to person, place, and time.   Skin: Skin is warm and dry.   Psychiatric: He has a normal mood and affect. His behavior is normal.       ASSESSMENT   #1 hereditary hemochromatosis: Current status not well-defined, will obtain laboratory work  #2 GERD: Exacerbation  #3 history of polyps: Due for follow-up examination this year      PLAN  CBC, CMP, iron studies including ferritin  Schedule EGD and colonoscopy        ICD-10-CM ICD-9-CM   1. Hereditary hemochromatosis (CMS/[facility]) E83.110 275.01   2. Gastroesophageal reflux disease, esophagitis presence not specified K21.9 530.81   3. Polyp of colon, unspecified part of colon, unspecified type K63.5 211.3

## 2019-04-09 ENCOUNTER — TELEPHONE (OUTPATIENT)
Dept: GASTROENTEROLOGY | Facility: CLINIC | Age: 64
End: 2019-04-09

## 2019-04-09 LAB
ALBUMIN SERPL-MCNC: 4.9 G/DL (ref 3.5–5.2)
ALBUMIN/GLOB SERPL: 1.8 G/DL
ALP SERPL-CCNC: 76 U/L (ref 39–117)
ALT SERPL-CCNC: 28 U/L (ref 1–41)
AST SERPL-CCNC: 28 U/L (ref 1–40)
BILIRUB SERPL-MCNC: 0.8 MG/DL (ref 0.2–1.2)
BUN SERPL-MCNC: 13 MG/DL (ref 8–23)
BUN/CREAT SERPL: 11.9 (ref 7–25)
CALCIUM SERPL-MCNC: 9.9 MG/DL (ref 8.6–10.5)
CHLORIDE SERPL-SCNC: 106 MMOL/L (ref 98–107)
CO2 SERPL-SCNC: 24.3 MMOL/L (ref 22–29)
CREAT SERPL-MCNC: 1.09 MG/DL (ref 0.76–1.27)
ERYTHROCYTE [DISTWIDTH] IN BLOOD BY AUTOMATED COUNT: 11.9 % (ref 12.3–15.4)
FERRITIN SERPL-MCNC: 287 NG/ML (ref 30–400)
GLOBULIN SER CALC-MCNC: 2.8 GM/DL
GLUCOSE SERPL-MCNC: 103 MG/DL (ref 65–99)
HCT VFR BLD AUTO: 46.1 % (ref 37.5–51)
HGB BLD-MCNC: 15.4 G/DL (ref 13–17.7)
IRON SERPL-MCNC: 225 MCG/DL (ref 59–158)
MCH RBC QN AUTO: 32.4 PG (ref 26.6–33)
MCHC RBC AUTO-ENTMCNC: 33.4 G/DL (ref 31.5–35.7)
MCV RBC AUTO: 96.8 FL (ref 79–97)
PLATELET # BLD AUTO: 163 10*3/MM3 (ref 140–450)
POTASSIUM SERPL-SCNC: 4.2 MMOL/L (ref 3.5–5.2)
PROT SERPL-MCNC: 7.7 G/DL (ref 6–8.5)
RBC # BLD AUTO: 4.76 10*6/MM3 (ref 4.14–5.8)
SODIUM SERPL-SCNC: 143 MMOL/L (ref 136–145)
WBC # BLD AUTO: 5.53 10*3/MM3 (ref 3.4–10.8)

## 2019-04-09 NOTE — TELEPHONE ENCOUNTER
Called pt and advised the his cmp looked good.  His hgb was normal.  Advised his iron level was elevated at 225.  Dr Costello recommends to resume monthly phlebotomies with a goal rate of hgb 11.5 and ferritin 60 or less.  Would proceed with endoscopies as planned.      Pt verb understanding and reports he is going to check with his insurance co and see where they want him to have the phlebotomies.  Pt states he will call and let us know where to send the order.

## 2019-04-09 NOTE — TELEPHONE ENCOUNTER
----- Message from Sid STARR MD sent at 4/9/2019 11:33 AM EDT -----  Regarding: Lab results  Okay to call results, recommend resuming phlebotomies on a monthly basis with goal rate of hemoglobin 11.5 , ferritin 60 or less.  Would proceed with endoscopies as planned.  ----- Message -----  From: Interface, Reflab Results In  Sent: 4/9/2019   3:08 AM  To: Sid STARR MD

## 2019-07-30 ENCOUNTER — TELEPHONE (OUTPATIENT)
Dept: GASTROENTEROLOGY | Facility: CLINIC | Age: 64
End: 2019-07-30

## 2019-07-30 NOTE — TELEPHONE ENCOUNTER
----- Message from Zara Bergman sent at 7/30/2019  3:11 PM EDT -----  Regarding: Record Request  Last office note from Pravin, any hfeg mutation.    [facility]  Phone [phone]  Fax [phone]

## (undated) DEVICE — SPIRAL GIGLI SAW BLADE 20 IN

## (undated) DEVICE — DUAL CUT SAGITTAL BLADE

## (undated) DEVICE — RECIPROCATING BLADE, DOUBLE SIDED, OFFSET  (70.0 X 0.64 X 12.6MM)

## (undated) DEVICE — APPL CHLORAPREP W/TINT 26ML ORNG

## (undated) DEVICE — STPLR SKIN VISISTAT WD 35CT

## (undated) DEVICE — PAD,ABDOMINAL,8"X10",ST,LF: Brand: MEDLINE

## (undated) DEVICE — CULT AER/ANAEROB FASTIDIOUS BACT

## (undated) DEVICE — DRAPE,REIN 53X77,STERILE: Brand: MEDLINE

## (undated) DEVICE — THIN OSTEOTOME BLADE 8MM X 3 IN: Brand: RENOVATION

## (undated) DEVICE — ANTIBACTERIAL UNDYED BRAIDED (POLYGLACTIN 910), SYNTHETIC ABSORBABLE SUTURE: Brand: COATED VICRYL

## (undated) DEVICE — SPNG GZ WOVN 4X4IN 12PLY 10/BX STRL

## (undated) DEVICE — SUT PDS 1 CT1 36IN Z347H

## (undated) DEVICE — PK KN TOTL 40

## (undated) DEVICE — KT DRN EVAC WND PVC 15F3/16IN 400CC

## (undated) DEVICE — Device

## (undated) DEVICE — GENESIS TROCHLEAR PIN 1/8 X 3: Brand: GENESIS

## (undated) DEVICE — SUT PDS 2 0 CT1 27IN CLR Z259H

## (undated) DEVICE — SPNG LAP 18X18IN LF STRL PK/5

## (undated) DEVICE — UNDERCAST PADDING: Brand: DEROYAL

## (undated) DEVICE — BNDG ELAS ELITE V/CLOSE 4IN 5YD LF STRL

## (undated) DEVICE — GLV SURG BIOGEL LTX PF 7 1/2

## (undated) DEVICE — DRSNG PAD ABD 8X10IN STRL

## (undated) DEVICE — OCCLUSIVE GAUZE STRIP,3% BISMUTH TRIBROMOPHENATE IN PETROLATUM BLEND: Brand: XEROFORM

## (undated) DEVICE — MAT FLR ABSORBENT LG 4FT 10 2.5FT

## (undated) DEVICE — ENCORE® LATEX ORTHO SIZE 7.5, STERILE LATEX POWDER-FREE SURGICAL GLOVE: Brand: ENCORE

## (undated) DEVICE — NDL SPINE 20G 3 1/2 YEL STRL 1P/U

## (undated) DEVICE — TOWEL,OR,DSP,ST,BLUE,STD,4/PK,20PK/CS: Brand: MEDLINE

## (undated) DEVICE — IMMOB KN 3PNL DLX CANVS 22IN BLU

## (undated) DEVICE — BNDG ELAS ELITE V/CLOSE 6IN 5YD LF STRL

## (undated) DEVICE — GENESIS TROCHLEAR PIN 1/8 IN. X 5IN: Brand: GENESIS

## (undated) DEVICE — DRSNG ADAPTIC 3X8

## (undated) DEVICE — KT DRN EVAC WND PVC PCH WTROC RND 10F400

## (undated) DEVICE — PICO SINGLE USE NEGATIVE PRESSURE WOUND THERAPY SYSTEM 10CM X 30CM 4IN. X 12IN.: Brand: PICO